# Patient Record
Sex: MALE | Race: WHITE
[De-identification: names, ages, dates, MRNs, and addresses within clinical notes are randomized per-mention and may not be internally consistent; named-entity substitution may affect disease eponyms.]

---

## 2018-05-04 ENCOUNTER — HOSPITAL ENCOUNTER (OUTPATIENT)
Dept: HOSPITAL 17 - NEPE | Age: 48
Setting detail: OBSERVATION
LOS: 3 days | Discharge: HOME | End: 2018-05-07
Attending: HOSPITALIST | Admitting: HOSPITALIST
Payer: MEDICARE

## 2018-05-04 VITALS
HEART RATE: 78 BPM | SYSTOLIC BLOOD PRESSURE: 116 MMHG | OXYGEN SATURATION: 95 % | DIASTOLIC BLOOD PRESSURE: 72 MMHG | RESPIRATION RATE: 16 BRPM

## 2018-05-04 VITALS
OXYGEN SATURATION: 94 % | SYSTOLIC BLOOD PRESSURE: 129 MMHG | DIASTOLIC BLOOD PRESSURE: 87 MMHG | RESPIRATION RATE: 20 BRPM | TEMPERATURE: 98.3 F | HEART RATE: 83 BPM

## 2018-05-04 VITALS
DIASTOLIC BLOOD PRESSURE: 79 MMHG | TEMPERATURE: 97.9 F | SYSTOLIC BLOOD PRESSURE: 109 MMHG | RESPIRATION RATE: 18 BRPM | HEART RATE: 68 BPM | OXYGEN SATURATION: 96 %

## 2018-05-04 VITALS — HEIGHT: 72 IN | BODY MASS INDEX: 20.6 KG/M2 | WEIGHT: 152.12 LBS

## 2018-05-04 VITALS
SYSTOLIC BLOOD PRESSURE: 118 MMHG | HEART RATE: 61 BPM | DIASTOLIC BLOOD PRESSURE: 74 MMHG | OXYGEN SATURATION: 95 % | TEMPERATURE: 97.9 F | RESPIRATION RATE: 16 BRPM

## 2018-05-04 VITALS
DIASTOLIC BLOOD PRESSURE: 74 MMHG | RESPIRATION RATE: 16 BRPM | OXYGEN SATURATION: 95 % | SYSTOLIC BLOOD PRESSURE: 107 MMHG | TEMPERATURE: 97.9 F | HEART RATE: 78 BPM

## 2018-05-04 VITALS — OXYGEN SATURATION: 98 %

## 2018-05-04 VITALS — OXYGEN SATURATION: 95 %

## 2018-05-04 VITALS — OXYGEN SATURATION: 94 %

## 2018-05-04 DIAGNOSIS — F14.90: ICD-10-CM

## 2018-05-04 DIAGNOSIS — Z91.19: ICD-10-CM

## 2018-05-04 DIAGNOSIS — R63.4: ICD-10-CM

## 2018-05-04 DIAGNOSIS — K64.8: ICD-10-CM

## 2018-05-04 DIAGNOSIS — K20.9: ICD-10-CM

## 2018-05-04 DIAGNOSIS — B20: ICD-10-CM

## 2018-05-04 DIAGNOSIS — K29.70: ICD-10-CM

## 2018-05-04 DIAGNOSIS — J44.9: ICD-10-CM

## 2018-05-04 DIAGNOSIS — K64.4: ICD-10-CM

## 2018-05-04 DIAGNOSIS — D69.3: ICD-10-CM

## 2018-05-04 DIAGNOSIS — K74.60: ICD-10-CM

## 2018-05-04 DIAGNOSIS — M19.90: ICD-10-CM

## 2018-05-04 DIAGNOSIS — F12.90: ICD-10-CM

## 2018-05-04 DIAGNOSIS — F41.9: ICD-10-CM

## 2018-05-04 DIAGNOSIS — B18.2: ICD-10-CM

## 2018-05-04 DIAGNOSIS — F17.210: ICD-10-CM

## 2018-05-04 DIAGNOSIS — N17.9: ICD-10-CM

## 2018-05-04 DIAGNOSIS — R55: Primary | ICD-10-CM

## 2018-05-04 DIAGNOSIS — Z90.81: ICD-10-CM

## 2018-05-04 LAB
ALBUMIN SERPL-MCNC: 3.3 GM/DL (ref 3.4–5)
ALP SERPL-CCNC: 141 U/L (ref 45–117)
ALT SERPL-CCNC: 37 U/L (ref 12–78)
AST SERPL-CCNC: 56 U/L (ref 15–37)
BASOPHILS # BLD AUTO: 0.1 TH/MM3 (ref 0–0.2)
BASOPHILS NFR BLD: 0.8 % (ref 0–2)
BILIRUB SERPL-MCNC: 0.6 MG/DL (ref 0.2–1)
BUN SERPL-MCNC: 10 MG/DL (ref 7–18)
CALCIUM SERPL-MCNC: 8.1 MG/DL (ref 8.5–10.1)
CHLORIDE SERPL-SCNC: 108 MEQ/L (ref 98–107)
CREAT SERPL-MCNC: 1.36 MG/DL (ref 0.6–1.3)
EOSINOPHIL # BLD: 0.4 TH/MM3 (ref 0–0.4)
EOSINOPHIL NFR BLD: 3.5 % (ref 0–4)
ERYTHROCYTE [DISTWIDTH] IN BLOOD BY AUTOMATED COUNT: 13.8 % (ref 11.6–17.2)
GFR SERPLBLD BASED ON 1.73 SQ M-ARVRAT: 55 ML/MIN (ref 89–?)
GLUCOSE SERPL-MCNC: 72 MG/DL (ref 74–106)
HCO3 BLD-SCNC: 18.6 MEQ/L (ref 21–32)
HCT VFR BLD CALC: 53.5 % (ref 39–51)
HGB BLD-MCNC: 18.3 GM/DL (ref 13–17)
INR PPP: 1.1 RATIO
LYMPHOCYTES # BLD AUTO: 3.3 TH/MM3 (ref 1–4.8)
LYMPHOCYTES NFR BLD AUTO: 27.7 % (ref 9–44)
MCH RBC QN AUTO: 30.9 PG (ref 27–34)
MCHC RBC AUTO-ENTMCNC: 34.2 % (ref 32–36)
MCV RBC AUTO: 90.3 FL (ref 80–100)
MONOCYTE #: 1.4 TH/MM3 (ref 0–0.9)
MONOCYTES NFR BLD: 11.9 % (ref 0–8)
NEUTROPHILS # BLD AUTO: 6.6 TH/MM3 (ref 1.8–7.7)
NEUTROPHILS NFR BLD AUTO: 56.1 % (ref 16–70)
PLATELET # BLD: 32 TH/MM3 (ref 150–450)
PMV BLD AUTO: 10.5 FL (ref 7–11)
PROT SERPL-MCNC: 7 GM/DL (ref 6.4–8.2)
PROTHROMBIN TIME: 10.9 SEC (ref 9.8–11.6)
RBC # BLD AUTO: 5.93 MIL/MM3 (ref 4.5–5.9)
SODIUM SERPL-SCNC: 139 MEQ/L (ref 136–145)
TROPONIN I SERPL-MCNC: (no result) NG/ML (ref 0.02–0.05)
TROPONIN I SERPL-MCNC: (no result) NG/ML (ref 0.02–0.05)
WBC # BLD AUTO: 11.8 TH/MM3 (ref 4–11)

## 2018-05-04 PROCEDURE — 82140 ASSAY OF AMMONIA: CPT

## 2018-05-04 PROCEDURE — 45378 DIAGNOSTIC COLONOSCOPY: CPT

## 2018-05-04 PROCEDURE — 86901 BLOOD TYPING SEROLOGIC RH(D): CPT

## 2018-05-04 PROCEDURE — 96360 HYDRATION IV INFUSION INIT: CPT

## 2018-05-04 PROCEDURE — 85025 COMPLETE CBC W/AUTO DIFF WBC: CPT

## 2018-05-04 PROCEDURE — 85730 THROMBOPLASTIN TIME PARTIAL: CPT

## 2018-05-04 PROCEDURE — 43239 EGD BIOPSY SINGLE/MULTIPLE: CPT

## 2018-05-04 PROCEDURE — 88305 TISSUE EXAM BY PATHOLOGIST: CPT

## 2018-05-04 PROCEDURE — 87902 NFCT AGT GNTYP ALYS HEP C: CPT

## 2018-05-04 PROCEDURE — 87522 HEPATITIS C REVRS TRNSCRPJ: CPT

## 2018-05-04 PROCEDURE — 80053 COMPREHEN METABOLIC PANEL: CPT

## 2018-05-04 PROCEDURE — 82105 ALPHA-FETOPROTEIN SERUM: CPT

## 2018-05-04 PROCEDURE — 96374 THER/PROPH/DIAG INJ IV PUSH: CPT

## 2018-05-04 PROCEDURE — 93306 TTE W/DOPPLER COMPLETE: CPT

## 2018-05-04 PROCEDURE — 71045 X-RAY EXAM CHEST 1 VIEW: CPT

## 2018-05-04 PROCEDURE — 82550 ASSAY OF CK (CPK): CPT

## 2018-05-04 PROCEDURE — 80307 DRUG TEST PRSMV CHEM ANLYZR: CPT

## 2018-05-04 PROCEDURE — 88312 SPECIAL STAINS GROUP 1: CPT

## 2018-05-04 PROCEDURE — G0378 HOSPITAL OBSERVATION PER HR: HCPCS

## 2018-05-04 PROCEDURE — 70450 CT HEAD/BRAIN W/O DYE: CPT

## 2018-05-04 PROCEDURE — 96361 HYDRATE IV INFUSION ADD-ON: CPT

## 2018-05-04 PROCEDURE — 74176 CT ABD & PELVIS W/O CONTRAST: CPT

## 2018-05-04 PROCEDURE — 86900 BLOOD TYPING SEROLOGIC ABO: CPT

## 2018-05-04 PROCEDURE — 93005 ELECTROCARDIOGRAM TRACING: CPT

## 2018-05-04 PROCEDURE — 80074 ACUTE HEPATITIS PANEL: CPT

## 2018-05-04 PROCEDURE — C9113 INJ PANTOPRAZOLE SODIUM, VIA: HCPCS

## 2018-05-04 PROCEDURE — 85610 PROTHROMBIN TIME: CPT

## 2018-05-04 PROCEDURE — 00813 ANES UPR LWR GI NDSC PX: CPT

## 2018-05-04 PROCEDURE — 84484 ASSAY OF TROPONIN QUANT: CPT

## 2018-05-04 PROCEDURE — 86850 RBC ANTIBODY SCREEN: CPT

## 2018-05-04 PROCEDURE — 83690 ASSAY OF LIPASE: CPT

## 2018-05-04 PROCEDURE — 82552 ASSAY OF CPK IN BLOOD: CPT

## 2018-05-04 PROCEDURE — 99285 EMERGENCY DEPT VISIT HI MDM: CPT

## 2018-05-04 RX ADMIN — STANDARDIZED SENNA CONCENTRATE AND DOCUSATE SODIUM SCH TAB: 8.6; 5 TABLET, FILM COATED ORAL at 21:36

## 2018-05-04 RX ADMIN — SODIUM CHLORIDE AND POTASSIUM CHLORIDE SCH MLS/HR: 9; 1.49 INJECTION, SOLUTION INTRAVENOUS at 17:11

## 2018-05-04 NOTE — RADRPT
EXAM DATE/TIME:  05/04/2018 13:59 

 

[facility] COMPARISON:     

CT ABDOMEN & PELVIS W CONTRAST, Marely 10, 2016, 21:10.

 

 

INDICATIONS :     

Blood in stool since yesterday

                  

 

ORAL CONTRAST:      

No oral contrast ingested.

                  

 

RADIATION DOSE:     

6.64 CTDIvol (mGy) 

 

 

MEDICAL HISTORY :     

Cardiovascular disease. Hepatitis C. 

 

SURGICAL HISTORY :      

Splenectomy. 

 

ENCOUNTER:      

Initial

 

ACUITY:      

1 day

 

PAIN SCALE:      

0/10

 

LOCATION:       

Bilateral  abdomen

 

TECHNIQUE:     

Volumetric scanning of the abdomen and pelvis was performed.  Using automated exposure control and ad
justment of the mA and/or kV according to patient size, radiation dose was kept as low as reasonably 
achievable to obtain optimal diagnostic quality images.  DICOM format image data is available electro
nically for review and comparison.  

 

FINDINGS:     

 

LOWER LUNGS:     

There is dependent atelectasis bilaterally.

 

LIVER:     

Homogeneous density without lesion.  There is no dilation of the biliary tree.  No calcified gallston
es.

 

SPLEEN:     

Multiple clips are present in the left upper quadrant reportedly related to splenectomy. There is res
idual splenic tissue in the left upper quadrant, stable from the prior examination.

 

PANCREAS:     

Within normal limits. 

 

KIDNEYS:     

Normal in size and shape.  There is no mass, stone, or hydronephrosis.

 

ADRENAL GLANDS:     

Within normal limits.

 

VASCULAR:     

There is no aortic aneurysm. There is mild atherosclerotic disease.

 

BOWEL/MESENTERY:     

The stomach, small bowel, and colon demonstrate no acute abnormality.  There is no free intraperitone
al air or fluid. 

 

ABDOMINAL WALL:     

Within normal limits.

 

RETROPERITONEUM:     

There is no lymphadenopathy.

 

BLADDER:     

No wall thickening or mass.

 

REPRODUCTIVE:     

Within normal limits.

 

INGUINAL:     

There is no lymphadenopathy or hernia.

 

MUSCULOSKELETAL:     

There are mild degenerative changes of the lumbar spine.

 

CONCLUSION:     

1. No acute finding is identified on this noncontrast examination to explain the clinical symptoms.

2. Mild atherosclerotic disease.

 

 

 

 Hua Elliott MD on May 04, 2018 at 14:37           

Board Certified Radiologist.

 This report was verified electronically.

## 2018-05-04 NOTE — EKG
Date Performed: 05/04/2018       Time Performed: 12:55:07

 

PTAGE:      48 years

 

EKG:      Sinus rhythm 

 

 BORDERLINE RIGHT AXIS DEVIATION SEPTAL MYOCARDIAL INFARCTION When compared to previous tracing, ante
rior T wave changes Seen on previous tracing, are less prominant. ABNORMAL ECG

 

PREVIOUS TRACING       : 06/18/2016 00.50

 

DOCTOR:   Nnamdi Baird  Interpretating Date/Time  05/04/2018 17:50:30

## 2018-05-04 NOTE — PD
Physical Exam


Date Seen by Provider:  May 4, 2018





Data


Data


Last Documented VS





Vital Signs








  Date Time  Temp Pulse Resp B/P (MAP) Pulse Ox O2 Delivery O2 Flow Rate FiO2


 


5/4/18 12:25     94 Room Air  


 


5/4/18 12:17 98.3 83 20 129/87 (101)    








Orders





 Orders


Complete Blood Count With Diff (5/4/18 12:22)


Comprehensive Metabolic Panel (5/4/18 12:22)


Lipase (5/4/18 12:22)


Ammonia (5/4/18 12:22)


Prothrombin Time / Inr (Pt) (5/4/18 12:22)


Act Partial Throm Time (Ptt) (5/4/18 12:22)


Alcohol (Ethanol) (5/4/18 12:22)


Urinalysis - C+S If Indicated (5/4/18 12:22)


Type And Screen (5/4/18 12:22)


Chest, Single Ap (5/4/18 12:22)


Ecg Monitoring (5/4/18 12:22)


Iv Access Insert/Monitor (5/4/18 12:22)


Oximetry (5/4/18 12:22)


Pantoprazole Inj (Protonix Inj) (5/4/18 12:30)


Sodium Chlor 0.9% 1000 Ml Inj (Ns 1000 M (5/4/18 12:22)


Sodium Chloride 0.9% Flush (Ns Flush) (5/4/18 12:30)


Electrocardiogram (5/4/18 12:22)


Ckmb (Isoenzyme) Profile (5/4/18 12:22)


Troponin I (5/4/18 12:22)


Sodium Chloride 0.9% Flush (Ns Flush) (5/4/18 12:30)


Ct Brain W/O Iv Contrast(Rout) (5/4/18 )


Ct Abd/Pel W/O Iv Contrast (5/4/18 12:54)


CKMB (5/4/18 12:30)


CKMB% (5/4/18 12:30)


Drug Screen, Random Urine (5/4/18 13:48)





Labs





Laboratory Tests








Test


  5/4/18


12:30


 


White Blood Count 11.8 TH/MM3 


 


Red Blood Count 5.93 MIL/MM3 


 


Hemoglobin 18.3 GM/DL 


 


Hematocrit 53.5 % 


 


Mean Corpuscular Volume 90.3 FL 


 


Mean Corpuscular Hemoglobin 30.9 PG 


 


Mean Corpuscular Hemoglobin


Concent 34.2 % 


 


 


Red Cell Distribution Width 13.8 % 


 


Platelet Count 32 TH/MM3 


 


Mean Platelet Volume 10.5 FL 


 


Neutrophils (%) (Auto) 56.1 % 


 


Lymphocytes (%) (Auto) 27.7 % 


 


Monocytes (%) (Auto) 11.9 % 


 


Eosinophils (%) (Auto) 3.5 % 


 


Basophils (%) (Auto) 0.8 % 


 


Neutrophils # (Auto) 6.6 TH/MM3 


 


Lymphocytes # (Auto) 3.3 TH/MM3 


 


Monocytes # (Auto) 1.4 TH/MM3 


 


Eosinophils # (Auto) 0.4 TH/MM3 


 


Basophils # (Auto) 0.1 TH/MM3 


 


CBC Comment AUTO DIFF 


 


Differential Comment


  AUTO DIFF


CONFIRMED


 


Prothrombin Time 10.9 SEC 


 


Prothromb Time International


Ratio 1.1 RATIO 


 


 


Activated Partial


Thromboplast Time 31.4 SEC 


 


 


Blood Urea Nitrogen 10 MG/DL 


 


Creatinine 1.36 MG/DL 


 


Random Glucose 72 MG/DL 


 


Total Protein 7.0 GM/DL 


 


Albumin 3.3 GM/DL 


 


Calcium Level 8.1 MG/DL 


 


Alkaline Phosphatase 141 U/L 


 


Aspartate Amino Transf


(AST/SGOT) 56 U/L 


 


 


Alanine Aminotransferase


(ALT/SGPT) 37 U/L 


 


 


Total Bilirubin 0.6 MG/DL 


 


Sodium Level 139 MEQ/L 


 


Potassium Level 3.8 MEQ/L 


 


Chloride Level 108 MEQ/L 


 


Carbon Dioxide Level 18.6 MEQ/L 


 


Anion Gap 12 MEQ/L 


 


Estimat Glomerular Filtration


Rate 55 ML/MIN 


 


 


Ammonia 37 MCMOL/L 


 


Total Creatine Kinase 267 U/L 


 


Creatine Kinase MB 5.4 NG/ML 


 


Troponin I


  LESS THAN 0.02


NG/ML


 


Lipase 203 U/L 


 


Ethyl Alcohol Level 29 MG/DL 











[facility]


Medical Record Reviewed:  Yes


Supervised Visit with CHERYL:  Yes


Narrative Course


I, Dr. Tubbs, have reviewed the advance practice practitioner's documentation 

and am in agreement, met with the patient face to face, made the diagnosis, and 

the medical decision making was done by me.  





*My assessment and Findings:


Syncope -patient is a 48-year-old male with history of hepatitis C, end-stage 

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 

syncopal episode today.  Reports bright red blood in stools, reports that today

, he has been having abdominal pain - pain worse to upper abdomen. 





CBC & BMP Diagram


5/4/18 12:30








Total Protein 7.0, Albumin 3.3 L, Calcium Level 8.1 L, Alkaline Phosphatase 141 

H, Aspartate Amino Transf (AST/SGOT) 56 H, Alanine Aminotransferase (ALT/SGPT) 

37, Total Bilirubin 0.6


Labs are reviewed, CT's pending











Niurka Tubbs DO May 4, 2018 14:05

## 2018-05-04 NOTE — PD
HPI


Chief Complaint:  GI Complaint


Time Seen by Provider:  12:22


Travel History


International Travel<30 days:  No


Contact w/Intl Traveler<30days:  No


Traveled to known affect area:  No





History of Present Illness


HPI


Patient comes emergency department complaining of syncopal episode that 

occurred shortly prior to arrival.  Patient reports he has had a couple 

syncopal episodes in the past several days.  Patient reports feeling fatigue 

and bright red blood per rectum.  Patient reports had similar in the past 

secondary to thrombocytopenia.  Patient reports he is HIV and hepatitis C 

positive.  Patient reports taking his medications as prescribed and his last 

viral load was undetectable per patient.  Patient complaining of chronic right 

upper quadrant pain is gotten progressively worse over the past couple of days.

  Describes pain is a sharp stabbing pain is worse to palpation.  Reports pain 

radiates throughout his abdomen.  Denies any nausea, vomiting, chest pain or 

shortness breath, fevers, headache, neck pain, or change in bladder.





PFSH


Past Medical History


Arthritis:  Yes


Asthma:  No


Autoimmune Disease:  Yes (HIV)


Blood Disorders:  Yes (THROMBOCYTOPENIA, HIV +)


Anxiety:  Yes


Depression:  No


Heart Rhythm Problems:  No


Cancer:  No


Cardiovascular Problems:  Yes


High Cholesterol:  No


Chemotherapy:  No


Chest Pain:  Yes


Congestive Heart Failure:  No


COPD:  Yes


Cerebrovascular Accident:  No


Diabetes:  No


Diminished Hearing:  No


Endocrine:  No


Gastrointestinal Disorders:  No


GERD:  No


Glaucoma:  No


Genitourinary:  No


Headaches:  No


Hepatitis:  Yes (C)


Hiatal Hernia:  No


Hypertension:  No


Immune Disorder:  Yes (HIV )


Implanted Vascular Access Dvce:  No


Kidney Stones:  No


Musculoskeletal:  Yes


Neurologic:  Yes


Psychiatric:  Yes


Reproductive:  No


Respiratory:  Yes (COPD)


Immunizations Current:  Yes


Migraines:  Yes


Myocardial Infarction:  No


Pneumonia:  Yes


Radiation Therapy:  No


Renal Failure:  No


Seizures:  No


Sickle Cell Disease:  No


Sleep Apnea:  Yes


Thyroid Disease:  No


Ulcer:  No


Tetanus Vaccination:  < 5 Years


Influenza Vaccination:  Yes


PNEUMOCCOCAL Vaccine (Year):  2





Past Surgical History


Abdominal Surgery:  Yes (SPLENECTOMY)


AICD:  No


Arteriovenous Shunt:  No


Cardiac Surgery:  No


Ear Surgery:  No


Endocrine Surgery:  No


Eye Surgery:  No


Genitourinary Surgery:  No


Gynecologic Surgery:  No


Insulin Pump:  No


Joint Replacement:  No


Neurologic Surgery:  No


Oral Surgery:  No


Pacemaker:  No


Thoracic Surgery:  Yes


Other Surgery:  Yes (spleenectomy )





Social History


Alcohol Use:  Yes ("occasionally" )


Tobacco Use:  Yes (1  PPD )


Substance Use:  Yes (marijuana use daily )





Allergies-Medications


(Allergen,Severity, Reaction):  


Coded Allergies:  


     No Known Allergies (Unverified , 6/17/16)


Reported Meds & Prescriptions





Reported Meds & Active Scripts


Active


Reported


Triumeq (Abacavir-Dolutegravir-Lamivudine) 600- Mg Tab 1 Tab PO DAILY


     Hazardous agent; use appropriate precautions for handling & disposal.








Review of Systems


Except as stated in HPI:  all other systems reviewed are Neg





Physical Exam


Narrative


GENERAL: Well-developed, well nourished, in no acute distress, and non-ill 

appearing.


SKIN: Focused skin assessment warm and dry.


HEAD: Atraumatic. Normocephalic. 


EYES: Pupils equal and round. EOMI. No scleral icterus. No injection or 

drainage. 


ENT: No nasal bleeding or discharge.  Mucous membranes pink and moist.


NECK: Trachea midline. Supple.  No nuclear rigidity.


CARDIOVASCULAR: Regular rate and rhythm.  No murmur appreciated.


RESPIRATORY: No accessory muscle use.  No respiratory distress. Clear to 

auscultation. Breath sounds equal bilaterally. 


GASTROINTESTINAL: Abdomen soft, nondistended, and no guarding. Hepatic and 

splenic margins not palpable.  Normal bowel sounds x4.  No pulsatile mass.  

Patient reports tenderness palpation throughout.


MUSCULOSKELETAL: No obvious deformities. No clubbing.  No cyanosis.  No edema.  

Full range of motion.


NEUROLOGICAL: Awake and alert. No obvious cranial nerve deficits.  Motor 

grossly within normal limits. Normal speech.


PSYCHIATRIC: Appropriate mood and affect; insight and judgment normal.





Data


Data


Last Documented VS





Vital Signs








  Date Time  Temp Pulse Resp B/P (MAP) Pulse Ox O2 Delivery O2 Flow Rate FiO2


 


5/4/18 14:56  78 16 116/72 (87) 95 Room Air  


 


5/4/18 12:17 98.3       








Orders





 Orders


Complete Blood Count With Diff (5/4/18 12:22)


Comprehensive Metabolic Panel (5/4/18 12:22)


Lipase (5/4/18 12:22)


Ammonia (5/4/18 12:22)


Prothrombin Time / Inr (Pt) (5/4/18 12:22)


Act Partial Throm Time (Ptt) (5/4/18 12:22)


Alcohol (Ethanol) (5/4/18 12:22)


Urinalysis - C+S If Indicated (5/4/18 12:22)


Type And Screen (5/4/18 12:22)


Chest, Single Ap (5/4/18 12:22)


Ecg Monitoring (5/4/18 12:22)


Iv Access Insert/Monitor (5/4/18 12:22)


Oximetry (5/4/18 12:22)


Pantoprazole Inj (Protonix Inj) (5/4/18 12:30)


Sodium Chlor 0.9% 1000 Ml Inj (Ns 1000 M (5/4/18 12:22)


Sodium Chloride 0.9% Flush (Ns Flush) (5/4/18 12:30)


Electrocardiogram (5/4/18 12:22)


Ckmb (Isoenzyme) Profile (5/4/18 12:22)


Troponin I (5/4/18 12:22)


Sodium Chloride 0.9% Flush (Ns Flush) (5/4/18 12:30)


Ct Brain W/O Iv Contrast(Rout) (5/4/18 )


Ct Abd/Pel W/O Iv Contrast (5/4/18 12:54)


CKMB (5/4/18 12:30)


CKMB% (5/4/18 12:30)


Drug Screen, Random Urine (5/4/18 13:48)


Admit Order (Ed Use Only) (5/4/18 )


Cardiac Monitor / Telemetry DANIAL.Q8H (5/4/18 15:21)


Vital Signs (Adult) Q4H (5/4/18 15:21)


Activity Bed Rest (5/4/18 15:21)


Notify Dr: Other (5/4/18 15:21)





Labs





Laboratory Tests








Test


  5/4/18


12:30


 


White Blood Count 11.8 TH/MM3 


 


Red Blood Count 5.93 MIL/MM3 


 


Hemoglobin 18.3 GM/DL 


 


Hematocrit 53.5 % 


 


Mean Corpuscular Volume 90.3 FL 


 


Mean Corpuscular Hemoglobin 30.9 PG 


 


Mean Corpuscular Hemoglobin


Concent 34.2 % 


 


 


Red Cell Distribution Width 13.8 % 


 


Platelet Count 32 TH/MM3 


 


Mean Platelet Volume 10.5 FL 


 


Neutrophils (%) (Auto) 56.1 % 


 


Lymphocytes (%) (Auto) 27.7 % 


 


Monocytes (%) (Auto) 11.9 % 


 


Eosinophils (%) (Auto) 3.5 % 


 


Basophils (%) (Auto) 0.8 % 


 


Neutrophils # (Auto) 6.6 TH/MM3 


 


Lymphocytes # (Auto) 3.3 TH/MM3 


 


Monocytes # (Auto) 1.4 TH/MM3 


 


Eosinophils # (Auto) 0.4 TH/MM3 


 


Basophils # (Auto) 0.1 TH/MM3 


 


CBC Comment AUTO DIFF 


 


Differential Comment


  AUTO DIFF


CONFIRMED


 


Prothrombin Time 10.9 SEC 


 


Prothromb Time International


Ratio 1.1 RATIO 


 


 


Activated Partial


Thromboplast Time 31.4 SEC 


 


 


Blood Urea Nitrogen 10 MG/DL 


 


Creatinine 1.36 MG/DL 


 


Random Glucose 72 MG/DL 


 


Total Protein 7.0 GM/DL 


 


Albumin 3.3 GM/DL 


 


Calcium Level 8.1 MG/DL 


 


Alkaline Phosphatase 141 U/L 


 


Aspartate Amino Transf


(AST/SGOT) 56 U/L 


 


 


Alanine Aminotransferase


(ALT/SGPT) 37 U/L 


 


 


Total Bilirubin 0.6 MG/DL 


 


Sodium Level 139 MEQ/L 


 


Potassium Level 3.8 MEQ/L 


 


Chloride Level 108 MEQ/L 


 


Carbon Dioxide Level 18.6 MEQ/L 


 


Anion Gap 12 MEQ/L 


 


Estimat Glomerular Filtration


Rate 55 ML/MIN 


 


 


Ammonia 37 MCMOL/L 


 


Total Creatine Kinase 267 U/L 


 


Creatine Kinase MB 5.4 NG/ML 


 


Troponin I


  LESS THAN 0.02


NG/ML


 


Lipase 203 U/L 


 


Ethyl Alcohol Level 29 MG/DL 











MDM


Medical Decision Making


Medical Screen Exam Complete:  Yes


Emergency Medical Condition:  Yes


Interpretation(s)


EKG reviewed by Dr. Tubbs shows sinus rhythm ventricular rate of 75.  No STEMI.


Differential Diagnosis


GI bleed, syncope, ITP, substance abuse, closed head injury, intracranial 

hemorrhage


Narrative Course


Patient was seen and examined.  IV was established patient placed on cardiac 

monitoring.  Initial laboratory radiological studies were ordered.  Patient was 

hydrated with IV fluids and given IV Protonix.  Discussed patient with Dr. Tubbs

, who saw and evaluated patient and is in agreement plan of care and 

disposition.  Discussed all findings with the exception of urinalysis, which 

patient has not provide a sample of yet, and plan of care with patient.  

Patient is agreeable for admission.  All questions were answered.  Discussed 

patient with hospitalist who is agreeable to admit the patient.  Patient 

remained stable throughout ED course.





[facility] Point of Care


Internal Pos. & Neg. Controls:  Passed


Fecal Specimen Occult Blood:  Negative


Comment


Verbal consent was obtained.  Digital rectal exam was performed. Stool specimen 

applied and test interpreted between 1 and 3 minutes of application and the 

result was negative.


Internal Controls: Both positive and negative controls were validated.  Staff 

RN Candy was present during this exam.  External hemorrhoids were noted.





Physician Communication


Physician Communication


[id number] discussed patient with Dr. Funk, who is agreeable to admit the 

patient.





Diagnosis





 Primary Impression:  


 Syncope


 Qualified Codes:  R55 - Syncope and collapse


 Additional Impressions:  


 Hematochezia


 Chronic ITP (idiopathic thrombocytopenia)





Admitting Information


Admitting Physician Requests:  Observation











Foster Francis May 4, 2018 13:03

## 2018-05-04 NOTE — HHI.HP
__________________________________________________





[facility]


Service


[facility]


Primary Care Physician


No Primary Care Physician


Admission Diagnosis





Syncope, hematochezia, ITP


Diagnoses:  


(1) Acute kidney injury


(2) Abdominal pain


(3) Hematochezia


(4) Chronic ITP (idiopathic thrombocytopenia)


(5) Syncope


(6) Hepatitis C


(7) HIV (human immunodeficiency virus infection)


(8) Tobacco use


Chief Complaint:  


Syncope


Travel History


International Travel<30 Days:  No


Contact w/Intl Traveler <30 Da:  No


Traveled to Known Affected Are:  No


History of Present Illness


The patient is a 48 year-old male who presented to the emergency department 

following a syncopal episode. He states that he has "blacked out" about 5 times 

in the past 3 days. He denies hitting his head.  He states that the episodes 

occur when he stands up to walk.  He feels lightheaded and then passes out.  He 

states that he has not been eating well over the past week or so.  He has had 

no appetite.  He has had some nausea, but no vomiting.  He denies diarrhea or 

constipation, but has had some bright red blood in his stool the last couple 

days.  He has a history of hepatitis C and HIV, as well as chronic 

thrombocytopenia.  He reports a weight loss of 10 pounds in the last week.  He 

has diffuse abdominal pain, worse in the upper abdomen.





Review of Systems


Constitutional:  COMPLAINS OF: Weight loss, DENIES: Fever, Chills, Night Sweats


Eyes:  DENIES: Blurred vision, Vision loss


Ears, nose, mouth, throat:  DENIES: Hearing loss


Respiratory:  DENIES: Cough, Wheezing, Sputum production, Shortness of breath


Cardiovascular:  COMPLAINS OF: Syncope, DENIES: Chest pain, Palpitations, 

Dyspnea on Exertion, Lower Extremity Edema


Gastrointestinal:  COMPLAINS OF: Bloody stools, Nausea, DENIES: Abdominal pain, 

Constipation, Diarrhea, Vomiting


Genitourinary:  DENIES: Urinary frequency, Urinary incontinence, Urgency, 

Hematuria, Dysuria, Nocturia


Musculoskeletal:  DENIES: Joint pain, Muscle aches


Integumentary:  DENIES: Pruritus, Rash


Hematologic/lymphatic:  DENIES: Bruising


Neurologic:  DENIES: Headache





Past Family Social History


Past Medical History


HIV


Thrombocytopenia


Hepatitis C


Anxiety


Arthritis


COPD


Past Surgical History


Splenectomy


Left ankle surgery


Reported Medications


Triumeq (Abacavir-Dolutegravir-Lamivudine) 600- Mg Tab 1 Tab PO DAILY


Allergies:  


Coded Allergies:  


     No Known Allergies (Unverified  Allergy, Unknown, 18)


Family History


Denies significant family medical history


Social History


Smokes 1 pack per day.  Admits to marijuana use.  Does also admit to using 

cocaine last night.  Reports a remote history of IV drug abuse, but not for 

"many years".  Reports occasional alcohol use.





Physical Exam


Vital Signs





Vital Signs








  Date Time  Temp Pulse Resp B/P (MAP) Pulse Ox O2 Delivery O2 Flow Rate FiO2


 


18 16:31        


 


18 14:56  78 16 116/72 (87) 95 Room Air  


 


18 12:25     94 Room Air  


 


18 12:17 98.3 83 20 129/87 (101) 94   








Physical Exam


GENERAL: Thin male in no acute distress. 


HEENT: Normocephalic, atraumatic. Pupils equal, round and reactive. Extraocular 

movements intact. No scleral icterus. No injection or drainage. Oropharynx is 

clear. Mucous membranes are somewhat dry. 


CARDIOVASCULAR: Regular rate and rhythm without murmurs, gallops, or rubs. 


RESPIRATORY: Clear to auscultation. No wheezes, rales, or rhonchi. Breathing is 

non-labored. 


GASTROINTESTINAL: Abdomen soft, nondistended. Tender to palpation diffusely 

without rebound or guarding, more tender in the upper abdomen.  


EXTREMITIES: No lower extremity edema. No calf tenderness.


PSYCH: Alert and oriented x 3.


Laboratory





Laboratory Tests








Test


  18


12:30


 


White Blood Count 11.8 


 


Red Blood Count 5.93 


 


Hemoglobin 18.3 


 


Hematocrit 53.5 


 


Mean Corpuscular Volume 90.3 


 


Mean Corpuscular Hemoglobin 30.9 


 


Mean Corpuscular Hemoglobin


Concent 34.2 


 


 


Red Cell Distribution Width 13.8 


 


Platelet Count 32 


 


Mean Platelet Volume 10.5 


 


Neutrophils (%) (Auto) 56.1 


 


Lymphocytes (%) (Auto) 27.7 


 


Monocytes (%) (Auto) 11.9 


 


Eosinophils (%) (Auto) 3.5 


 


Basophils (%) (Auto) 0.8 


 


Neutrophils # (Auto) 6.6 


 


Lymphocytes # (Auto) 3.3 


 


Monocytes # (Auto) 1.4 


 


Eosinophils # (Auto) 0.4 


 


Basophils # (Auto) 0.1 


 


CBC Comment AUTO DIFF 


 


Differential Comment


  AUTO DIFF


CONFIRMED


 


Prothrombin Time 10.9 


 


Prothromb Time International


Ratio 1.1 


 


 


Activated Partial


Thromboplast Time 31.4 


 


 


Blood Urea Nitrogen 10 


 


Creatinine 1.36 


 


Random Glucose 72 


 


Total Protein 7.0 


 


Albumin 3.3 


 


Calcium Level 8.1 


 


Alkaline Phosphatase 141 


 


Aspartate Amino Transf


(AST/SGOT) 56 


 


 


Alanine Aminotransferase


(ALT/SGPT) 37 


 


 


Total Bilirubin 0.6 


 


Sodium Level 139 


 


Potassium Level 3.8 


 


Chloride Level 108 


 


Carbon Dioxide Level 18.6 


 


Anion Gap 12 


 


Estimat Glomerular Filtration


Rate 55 


 


 


Ammonia 37 


 


Total Creatine Kinase 267 


 


Creatine Kinase MB 5.4 


 


Troponin I LESS THAN 0.02 


 


Lipase 203 


 


Ethyl Alcohol Level 29 








Result Diagram:  


18 1230                                                                    

            18 1230





Imaging





Last Impressions








Abdomen/Pelvis CT 18 1254 Signed





Impressions: 





 Service Date/Time:  Friday, May 4, 2018 13:59 - CONCLUSION:  1. No acute 

finding 





 is identified on this noncontrast examination to explain the clinical 

symptoms. 





 2. Mild atherosclerotic disease.     Hua Elliott MD 


 


Chest X-Ray 18 1222 Signed





Impressions: 





 Service Date/Time:  Friday, May 4, 2018 12:37 - CONCLUSION:  Stable appearance 





 with no acute disease.       Saul Ulloa MD 


 


Head CT 18 0000 Signed





Impressions: 





 Service Date/Time:  Friday, May 4, 2018 13:57 - CONCLUSION:  1. No acute 





 intracranial abnormality identified.     Mateo Yeung MD 











Caprini VTE Risk Assessment


Caprini VTE Risk Assessment:  No/Low Risk (score <= 1)


Caprini Risk Assessment Model











 Point Value = 1          Point Value = 2  Point Value = 3  Point Value = 5


 


Age 41-60


Minor surgery


BMI > 25 kg/m2


Swollen legs


Varicose veins


Pregnancy or postpartum


History of unexplained or recurrent


   spontaneous 


Oral contraceptives or hormone


   replacement


Sepsis (< 1 month)


Serious lung disease, including


   pneumonia (< 1 month)


Abnormal pulmonary function


Acute myocardial infarction


Congestive heart failure (< 1 month)


History of inflammatory bowel disease


Medical patient at bed rest Age 61-74


Arthroscopic surgery


Major open surgery (> 45 min)


Laparoscopic surgery (> 45 min)


Malignancy


Confined to bed (> 72 hours)


Immobilizing plaster cast


Central venous access Age >= 75


History of VTE


Family history of VTE


Factor V Leiden


Prothrombin 06605A


Lupus anticoagulant


Anticardiolipin antibodies


Elevated serum homocysteine


Heparin-induced thrombocytopenia


Other congenital or acquired


   thrombophilia Stroke (< 1 month)


Elective arthroplasty


Hip, pelvis, or leg fracture


Acute spinal cord injury (< 1 month)








Prophylaxis Regimen











   Total Risk


Factor Score Risk Level Prophylaxis Regimen


 


0-1      Low Early ambulation


 


2 Moderate Order ONE of the following:


*Sequential Compression Device (SCD)


*Heparin 5000 units SQ BID


 


3-4 Higher Order ONE of the following medications:


*Heparin 5000 units SQ TID


*Enoxaparin/Lovenox 40 mg SQ daily (WT < 150 kg, CrCl > 30 mL/min)


*Enoxaparin/Lovenox 30 mg SQ daily (WT < 150 kg, CrCl > 10-29 mL/min)


*Enoxaparin/Lovenox 30 mg SQ BID (WT < 150 kg, CrCl > 30 mL/min)


AND/OR


*Sequential Compression Device (SCD)


 


5 or more Highest Order ONE of the following medications:


*Heparin 5000 units SQ TID (Preferred with Epidurals)


*Enoxaparin/Lovenox 40 mg SQ daily (WT < 150 kg, CrCl > 30 mL/min)


*Enoxaparin/Lovenox 30 mg SQ daily (WT < 150 kg, CrCl > 10-29 mL/min)


*Enoxaparin/Lovenox 30 mg SQ BID (WT < 150 kg, CrCl > 30 mL/min)


AND


*Sequential Compression Device (SCD)











Assessment and Plan


Assessment and Plan


1.  Syncopal episode: Possibly secondary to dehydration.  Check echocardiogram.

  Monitor on telemetry.  Check serial cardiac enzymes and EKGs.


2.  Acute kidney injury: Patient appears to be dehydrated.  He has had poor 

oral intake over the past week with decreased appetite, nausea, abdominal pain, 

and weight loss.  Start IV fluids.  Monitor BUN and creatinine.


3.  Thrombocytopenia: Patient reports chronic ITP.  Monitor labs.  Monitor for 

worsening bleeding episodes.


4.  Hematochezia: Patient reports bright red blood with bowel movements.  

Consult GI.


5.  HIV: Continue home medications.


6.  Hepatitis C: Patient reports no treatment.  GI consultation requested.


7.  DVT prophylaxis: SCDs, ULICES hose.  Avoid chemical prophylaxis secondary to 

hematochezia, thrombocytopenia.


8.  Polysubstance abuse: He admits to marijuana and cocaine use.  Patient was 

counseled.


9.  Tobacco abuse: Patient was counseled regarding smoking cessation.





Problem Qualifiers





(1) Syncope:  


Qualified Codes:  R55 - Syncope and collapse








Duke Funk MD May 4, 2018 17:36

## 2018-05-04 NOTE — RADRPT
EXAM DATE/TIME:  05/04/2018 12:37 

 

[facility] COMPARISON:     

CHEST SINGLE AP, June 17, 2016, 23:49.

 

                     

INDICATIONS :     

Short of breath.

                     

 

MEDICAL HISTORY :     

Chronic obstructive pulmonary disease.          

 

SURGICAL HISTORY :     

None.   

 

ENCOUNTER:     

Initial                                        

 

ACUITY:     

2 days      

 

PAIN SCORE:     

8/10

 

LOCATION:     

Bilateral chest 

 

FINDINGS:     

A single view of the chest demonstrates the lungs to be symmetrically aerated without evidence of mas
s, infiltrate or effusion.  The cardiomediastinal contours are unremarkable.  Osseous structures are 
intact. Multiple surgical clips in the upper left abdomen status post apparent splenectomy.

 

CONCLUSION:     

Stable appearance with no acute disease.  

 

 

 

 Saul Ulloa MD on May 04, 2018 at 12:49           

Board Certified Radiologist.

 This report was verified electronically.

## 2018-05-04 NOTE — RADRPT
EXAM DATE/TIME:  05/04/2018 13:57 

 

[facility] COMPARISON:     

CT ABDOMEN & PELVIS W CONTRAST, Marely 10, 2016, 21:10.

 

 

INDICATIONS :     

Syncopal episode

                      

 

RADIATION DOSE:     

56.35 CTDIvol (mGy) 

 

 

 

MEDICAL HISTORY :     

Cardiovascular disease. Hepatitis C. 

 

SURGICAL HISTORY :      

Splenectomy. 

 

ENCOUNTER:      

Initial

 

ACUITY:      

1 day

 

PAIN SCALE:      

0/10

 

LOCATION:        

cranial 

 

TECHNIQUE:     

Multiple contiguous axial images were obtained of the head.  Using automated exposure control and adj
ustment of the mA and/or kV according to patient size, radiation dose was kept as low as reasonably a
chievable to obtain optimal diagnostic quality images.   DICOM format image data is available electro
nically for review and comparison.  

 

FINDINGS:     

 

CEREBRUM:     

The ventricles are normal for age.  No evidence of midline shift, mass lesion, hemorrhage or acute in
farction.  No extra-axial fluid collections are seen.

 

POSTERIOR FOSSA:     

The cerebellum and brainstem are intact.  The 4th ventricle is midline.  The cerebellopontine angle i
s unremarkable.

 

EXTRACRANIAL:     

The visualized portion of the orbits is intact.

 

SKULL:     

The calvaria is intact.  No evidence of skull fracture.

 

CONCLUSION:     

1. No acute intracranial abnormality identified.

 

 

 

 Mateo Yeung MD on May 04, 2018 at 14:29           

Board Certified Radiologist.

 This report was verified electronically.

## 2018-05-05 VITALS
RESPIRATION RATE: 16 BRPM | TEMPERATURE: 97.9 F | OXYGEN SATURATION: 93 % | HEART RATE: 56 BPM | SYSTOLIC BLOOD PRESSURE: 112 MMHG | DIASTOLIC BLOOD PRESSURE: 76 MMHG

## 2018-05-05 VITALS
RESPIRATION RATE: 18 BRPM | HEART RATE: 66 BPM | SYSTOLIC BLOOD PRESSURE: 124 MMHG | TEMPERATURE: 99.1 F | DIASTOLIC BLOOD PRESSURE: 85 MMHG | OXYGEN SATURATION: 93 %

## 2018-05-05 VITALS
DIASTOLIC BLOOD PRESSURE: 72 MMHG | HEART RATE: 69 BPM | TEMPERATURE: 98 F | SYSTOLIC BLOOD PRESSURE: 127 MMHG | OXYGEN SATURATION: 93 % | RESPIRATION RATE: 19 BRPM

## 2018-05-05 VITALS
RESPIRATION RATE: 18 BRPM | DIASTOLIC BLOOD PRESSURE: 74 MMHG | HEART RATE: 67 BPM | OXYGEN SATURATION: 94 % | SYSTOLIC BLOOD PRESSURE: 122 MMHG | TEMPERATURE: 98.4 F

## 2018-05-05 VITALS
SYSTOLIC BLOOD PRESSURE: 132 MMHG | HEART RATE: 76 BPM | RESPIRATION RATE: 18 BRPM | TEMPERATURE: 97.6 F | DIASTOLIC BLOOD PRESSURE: 93 MMHG | OXYGEN SATURATION: 94 %

## 2018-05-05 VITALS — HEART RATE: 67 BPM

## 2018-05-05 VITALS — HEART RATE: 62 BPM

## 2018-05-05 VITALS
RESPIRATION RATE: 18 BRPM | HEART RATE: 73 BPM | OXYGEN SATURATION: 96 % | TEMPERATURE: 97.8 F | SYSTOLIC BLOOD PRESSURE: 117 MMHG | DIASTOLIC BLOOD PRESSURE: 75 MMHG

## 2018-05-05 LAB
ALBUMIN SERPL-MCNC: 2.6 GM/DL (ref 3.4–5)
ALP SERPL-CCNC: 121 U/L (ref 45–117)
ALT SERPL-CCNC: 33 U/L (ref 12–78)
AST SERPL-CCNC: 45 U/L (ref 15–37)
BASOPHILS # BLD AUTO: 0.1 TH/MM3 (ref 0–0.2)
BASOPHILS NFR BLD: 1.4 % (ref 0–2)
BILIRUB SERPL-MCNC: 0.5 MG/DL (ref 0.2–1)
BUN SERPL-MCNC: 14 MG/DL (ref 7–18)
CALCIUM SERPL-MCNC: 7.7 MG/DL (ref 8.5–10.1)
CHLORIDE SERPL-SCNC: 112 MEQ/L (ref 98–107)
CREAT SERPL-MCNC: 1 MG/DL (ref 0.6–1.3)
EOSINOPHIL # BLD: 0.5 TH/MM3 (ref 0–0.4)
EOSINOPHIL NFR BLD: 5.5 % (ref 0–4)
ERYTHROCYTE [DISTWIDTH] IN BLOOD BY AUTOMATED COUNT: 13.8 % (ref 11.6–17.2)
GFR SERPLBLD BASED ON 1.73 SQ M-ARVRAT: 79 ML/MIN (ref 89–?)
GLUCOSE SERPL-MCNC: 74 MG/DL (ref 74–106)
HCO3 BLD-SCNC: 22.2 MEQ/L (ref 21–32)
HCT VFR BLD CALC: 51.1 % (ref 39–51)
HGB BLD-MCNC: 17.4 GM/DL (ref 13–17)
LYMPHOCYTES # BLD AUTO: 3 TH/MM3 (ref 1–4.8)
LYMPHOCYTES NFR BLD AUTO: 34.4 % (ref 9–44)
MCH RBC QN AUTO: 30.7 PG (ref 27–34)
MCHC RBC AUTO-ENTMCNC: 34 % (ref 32–36)
MCV RBC AUTO: 90.2 FL (ref 80–100)
MONOCYTE #: 1.4 TH/MM3 (ref 0–0.9)
MONOCYTES NFR BLD: 16.6 % (ref 0–8)
NEUTROPHILS # BLD AUTO: 3.6 TH/MM3 (ref 1.8–7.7)
NEUTROPHILS NFR BLD AUTO: 42.1 % (ref 16–70)
PLATELET # BLD: 30 TH/MM3 (ref 150–450)
PMV BLD AUTO: 10.3 FL (ref 7–11)
PROT SERPL-MCNC: 5.8 GM/DL (ref 6.4–8.2)
RBC # BLD AUTO: 5.67 MIL/MM3 (ref 4.5–5.9)
SODIUM SERPL-SCNC: 141 MEQ/L (ref 136–145)
TROPONIN I SERPL-MCNC: (no result) NG/ML (ref 0.02–0.05)
WBC # BLD AUTO: 8.7 TH/MM3 (ref 4–11)

## 2018-05-05 RX ADMIN — HYDROCODONE BITARTRATE AND ACETAMINOPHEN PRN TAB: 10; 325 TABLET ORAL at 14:15

## 2018-05-05 RX ADMIN — DOLUTEGRAVIR SODIUM SCH MG: 50 TABLET, FILM COATED ORAL at 10:30

## 2018-05-05 RX ADMIN — ABACAVIR SULFATE SCH MG: 300 TABLET, FILM COATED ORAL at 10:30

## 2018-05-05 RX ADMIN — SODIUM CHLORIDE AND POTASSIUM CHLORIDE SCH MLS/HR: 9; 1.49 INJECTION, SOLUTION INTRAVENOUS at 05:11

## 2018-05-05 RX ADMIN — SODIUM CHLORIDE AND POTASSIUM CHLORIDE SCH MLS/HR: 9; 1.49 INJECTION, SOLUTION INTRAVENOUS at 18:49

## 2018-05-05 RX ADMIN — NICOTINE SCH PATCH: 21 PATCH, EXTENDED RELEASE TOPICAL at 14:39

## 2018-05-05 RX ADMIN — STANDARDIZED SENNA CONCENTRATE AND DOCUSATE SODIUM SCH TAB: 8.6; 5 TABLET, FILM COATED ORAL at 10:30

## 2018-05-05 RX ADMIN — STANDARDIZED SENNA CONCENTRATE AND DOCUSATE SODIUM SCH TAB: 8.6; 5 TABLET, FILM COATED ORAL at 20:33

## 2018-05-05 RX ADMIN — HYDROCODONE BITARTRATE AND ACETAMINOPHEN PRN TAB: 10; 325 TABLET ORAL at 22:54

## 2018-05-05 RX ADMIN — BUDESONIDE AND FORMOTEROL FUMARATE DIHYDRATE SCH PUFF: 160; 4.5 AEROSOL RESPIRATORY (INHALATION) at 20:33

## 2018-05-05 RX ADMIN — HYDROCODONE BITARTRATE AND ACETAMINOPHEN PRN TAB: 10; 325 TABLET ORAL at 18:49

## 2018-05-05 NOTE — HHI.PR
Subjective


Remarks


Follow up for syncope, hematochezia, thrombocytopenia. The patient reports no 

BM overnight so far. He reports some dizziness upon standing, but denies any 

near syncopal episodes since his arrival. Denies any chest pain or shortness of 

breath. He states his abdomen is always sore but is slightly worse recently 

across bilateral upper quadrants. Denies nausea or vomiting. He tolerated oral 

intake last night. He has no other medical complaints at this time.





Objective


Vitals





Vital Signs








  Date Time  Temp Pulse Resp B/P (MAP) Pulse Ox O2 Delivery O2 Flow Rate FiO2


 


5/5/18 11:50 98.0 69 19 127/72 (90) 93   


 


5/5/18 10:20  62      


 


5/5/18 08:33 97.8 73 18 117/75 (89) 96   


 


5/5/18 04:21 97.9 56 16 112/76 (88) 93   


 


5/5/18 02:20  62      


 


5/4/18 22:48 97.9 61 16 118/74 (89) 95   


 


5/4/18 20:16 97.9 78 16 107/74 (85) 95   


 


5/4/18 19:21     98   


 


5/4/18 17:29 97.9 68 18 109/79 (89) 96   


 


5/4/18 17:11     95   21


 


5/4/18 16:31        


 


5/4/18 14:56  78 16 116/72 (87) 95 Room Air  














I/O      


 


 5/4/18 5/4/18 5/4/18 5/5/18 5/5/18 5/5/18





 07:00 15:00 23:00 07:00 15:00 23:00


 


Intake Total   1000 ml   


 


Output Total     750 ml 


 


Balance   1000 ml  -750 ml 


 


      


 


Intake IV Total   1000 ml   


 


Output Urine Total     750 ml 








Result Diagram:  


5/5/18 0712                                                                    

            5/5/18 0712





Imaging





Last Impressions








Abdomen/Pelvis CT 5/4/18 1254 Signed





Impressions: 





 Service Date/Time:  Friday, May 4, 2018 13:59 - CONCLUSION:  1. No acute 

finding 





 is identified on this noncontrast examination to explain the clinical 

symptoms. 





 2. Mild atherosclerotic disease.     Hua Elliott MD 


 


Chest X-Ray 5/4/18 1222 Signed





Impressions: 





 Service Date/Time:  Friday, May 4, 2018 12:37 - CONCLUSION:  Stable appearance 





 with no acute disease.       Saul Ulloa MD 


 


Head CT 5/4/18 0000 Signed





Impressions: 





 Service Date/Time:  Friday, May 4, 2018 13:57 - CONCLUSION:  1. No acute 





 intracranial abnormality identified.     Mateo Yeung MD 








Objective Remarks


GENERAL: Well-nourished, well-developed middle aged male patient in [facility].


SKIN: Warm and dry. No rash.


HEENT:  Normocephalic. Atraumatic.Pupils equal and round. Mucous membranes pink 

and moist.


CARDIOVASCULAR: Regular rate and rhythm. No murmur appreciated. 


RESPIRATORY: No accessory muscle use. Clear to auscultation. Breath sounds 

equal bilaterally.  


GASTROINTESTINAL: Abdomen soft, nondistended, diffuse TTP, worse at bilateral 

upper quadrants. Normoactive bowel sounds x4.


MUSCULOSKELETAL: No obvious deformities. Extremities without clubbing, cyanosis

, or edema. 


NEUROLOGICAL: Awake and alert. No obvious cranial nerve deficits.  Motor 

grossly within normal limits.  Normal speech.


PSYCHIATRIC: Appropriate mood and affect; insight and judgment normal.


Medications and IVs





Current Medications








 Medications


  (Trade)  Dose


 Ordered  Sig/[place]


 Route  Start Time


 Stop Time Status Last Admin


 


  (NS Flush)  2 ml  UNSCH  PRN


 IVF  5/4/18 12:30


     


 


 


  (NS Flush)  2 ml  UNSCH  PRN


 IVF  5/4/18 12:30


     


 


 


  (Zofran Inj)  4 mg  Q6H  PRN


 IVP  5/4/18 16:15


     


 


 


  (Narcan Inj)  0.4 mg  UNSCH  PRN


 IV PUSH  5/4/18 16:15


     


 


 


  (Vivien-Colace)  1 tab  BID


 PO  5/4/18 21:00


    5/5/18 10:30


 


 


  (Milk Of


 Magnesia Liq)  30 ml  Q12H  PRN


 PO  5/4/18 16:15


     


 


 


  (Senokot)  17.2 mg  Q12H  PRN


 PO  5/4/18 16:15


     


 


 


  (Dulcolax Supp)  10 mg  DAILY  PRN


 RECTAL  5/4/18 16:15


     


 


 


  (Lactulose Liq)  30 ml  DAILY  PRN


 PO  5/4/18 16:15


     


 


 


 Potassium


 Chloride/Sodium


 Chloride  1,000 ml @ 


 84 mls/hr  [id number]


 IV  5/4/18 16:15


    5/5/18 05:11


 


 


  (Epivir)  300 mg  DAILY


 PO  5/5/18 09:00


    5/5/18 10:30


 


 


  (Ziagen)  600 mg  DAILY


 PO  5/5/18 09:00


    5/5/18 10:30


 











A/P


Problem List:  


(1) Acute kidney injury


ICD Code:  N17.9 - Acute kidney failure, unspecified


(2) Abdominal pain


ICD Code:  R10.9 - Unspecified abdominal pain


(3) Hematochezia


ICD Code:  K92.1 - Hematochezia


Status:  Acute


(4) Chronic ITP (idiopathic thrombocytopenia)


ICD Code:  D69.3 - Immune thrombocytopenic purpura


Status:  Acute


(5) Syncope


ICD Code:  R55 - Syncope and collapse


Status:  Acute


(6) Hepatitis C


ICD Code:  B19.20 - Hepatitis C


Status:  Chronic


(7) HIV (human immunodeficiency virus infection)


ICD Code:  Z21 - HIV (human immunodeficiency virus infection)


Status:  Chronic


(8) Tobacco use


ICD Code:  Z72.0 - Tobacco use


Status:  Chronic


Assessment and Plan


48 year-old male with history of HIV, Hepatitis C, Chronic thrombocytopenia, s/

p splenectomy, COPD, arthritis, anxiety, presents for syncope and hematochezia 

  





Syncopal episode: Suspect secondary to dehydration however rule out other 

etiologies such as ACS, cardiomyopathy, arrhythmia


   -Head CT images reviewed, now acute findings


   -ACS ruled out with negative serial cardiac enzymes x3 and EKG without acute 

ST changes


   -Monitor on telemetry


   -Check echocardiogram. 


   -Give IVF hydration





Abdominal Pain/Hematochezia/Weight Loss: Patient reports bright red blood with 

bowel movements.  Rectal exam in the ED revealed external hemorrhoids however 

hemoccult negative. 


   -Abd/pelvis CT reviewed, no acute findings


   -Monitor CBC, currently stable with Hgb 18.3 --> 17.4


   -check stool hemoccult


   -Consult GI, appreciate recommendations





Acute kidney injury: Patient appears to be dehydrated.  He has had poor oral 

intake over the past week with decreased appetite, nausea, abdominal pain, and 

weight loss.  


   -Continue IV fluids. 


   -Avoid nephrotoxins


   -Creatinine improved 1.36 --> 1.00


   -Continue to monitor





Thrombocytopenia: Patient reports chronic ITP.  


   -Monitor labs. 


   -Monitor for worsening bleeding episodes.


   -Consulted hematology





HIV: Chronic


   -continue home medications.





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  


   -hepatitis profile ordered


   -GI consultation requested.





Polysubstance abuse: He admits to marijuana and cocaine use.  


   -Patient was counseled.





Tobacco abuse: chronic


   -Patient was counseled regarding smoking cessation.





DVT prophylaxis: SCDs, ULICES hose.  Avoid chemical prophylaxis secondary to 

hematochezia, thrombocytopenia.





Problem Qualifiers





(1) Syncope:  


Qualified Codes:  R55 - Syncope and collapse








Marilyn Macias PA-C May 5, 2018 12:29 pm

## 2018-05-05 NOTE — MB
cc:

Chi Johnson MD, Richard MD

****

 

 

DATE:

05/05/2018

 

REASON FOR CONSULTATION:

1.  Idiopathic thrombocytopenic purpura.

2.  Previous history of HIV positivity.

3.  Hepatitis C.

4.  History of noncompliance.

5.  Syncope.

6.  Recent gastrointestinal bleeding.

7.  Drug use.

 

PATIENT PROFILE:

The patient is a 48-year-old white male.  He is currently not .

He has a fiance and will  in the next 1 or 2 months.  He has a 

daughter.  He was born in [place], [place].  He is currently 

living with his fiancee.  He is on disability.  He spent 21 months in 

nursing home for assault and was released on 03/08/2018.  He smokes a pack of

cigarettes per day.  In the past he drank alcohol, but currently has 

only a rare drink.  He had used drugs extensively in the past. 

He rarely  uses marijuana although several days ago used  

cocaine on one occasion to help sustain an erection.

 

HISTORY OF PRESENT ILLNESS:

The patient is well known to the Hematology/Oncology group.  He was 

last seen by Dr. Richardson in 09/2016.  He has had ITP and has had 

multiple treatments.  There was discussion about treating him with 

Nplate.  The patient indicates that he may have received Nplate, but 

it did not help much.  It is unclear to me what happened as there were

no followup appointments after this and he ended up returning to the 

nursing home.

 

His immediate problem dates back  to 5 or 6 days.  He has had between 5 and 6 

episodes of syncope over the past five days with slight lightheadedness before 
syncope.  

He has not had any injuries related to the fall.  He denies chest pain

or palpitations.  He has had a slight amount of rectal bleeding on 2 

occasions  The blood has been bright red.  For this reason, he came to

the emergency room on 05/04/2018. Hemoglobin 18.3, hematocrit 53.  

White count 11,800, and platelets of 32,000.  On 05/05 hemoglobin 

17.4, white count 8700, and platelets are 30,000.  In looking back 

over previous records, the last platelet count I have is dated 09/2016

and is 16,000.

 

He tells me that his HIV is under control and his viral load is 0.  He

tells me that his hepatitis C is not under control.  He was told in 

the nursing home that he his viral load was  1.5 million and he was told that he 
would 

in the future need treatment.

 

Since admission, he has undergone an abdominal and pelvic CT scan.  

There are multiple clips present in the left upper quadrant.  He has 

had a previous splenectomy.  There is residual splenic tissue in the 

left upper quadrant, which is stable from a prior exam.  The liver is 

homogeneous without lesions.  There was no mention of any cirrhosis.  

Other studies include a CMP on 05/05/2018.  Electrolytes, BUN and 

creatinine are normal.  AST 45, ALT 33, alkaline phosphatase 121, 

albumin is 2.6.  His current ethyl alcohol level on 05/04 is 29.  PT 

is 10.9, PTT is 31.

 

PAST SURGICAL HISTORY:

1.  Splenectomy.

2.  Left ankle surgery.

 

PAST MEDICAL HISTORY:

1.  HIV which the patient states is under control with a 0 viral load 

and he indicates that his CD4 count is approximately 780,

2.  ITP.

3.  Hepatitis C, which he indicates is not under control.

3.  Anxiety.

4.  Arthritis.

5.  COPD.

6.  Tobacco use.

7.  Noncompliance.

8.  Drug use.

 

ALLERGIES:

NO KNOWN ALLERGIES TO MEDICATIONS.

 

FAMILY HISTORY:

Noncontributory.

 

MEDICATIONS PRIOR TO ADMISSION:

1.  Combination of Bzrfhxnr-Kotvqioacmgb-Fgzhgwdhtk

2.  Albuterol

3.  Flexeril.

4.  Valium

5.  Advair inhaler.

6.  [place].

 

REVIEW OF SYSTEMS:

CONSTITUTIONAL:  No visual or hearing problems.  No chest pain, 

palpitations.  The patient has had 5 or 6 episodes of syncope with 

lightheadedness.

RESPIRATORY:  No shortness of breath.

GASTROINTESTINAL:  Notable for 2 episodes of hematochezia.

GENITOURINARY:  No dysuria or frequency.

MUSCULOSKELETAL:  Pain in the left ankle.

NEUROLOGIC:  No focal weakness.

PSYCHIATRIC:  Denies depression.

SKIN:  Some excoriations of skin.

 

PHYSICAL EXAMINATION:

GENERAL:  Reveals  a gentleman in no acute distress.

VITAL SIGNS:  Blood pressure 120/80, respiratory rate 18, pulse 66, 

temperature 99.1, O2 saturation 93%.

HEENT:  Head is normocephalic.  Sclerae and conjunctivae are normal.  

Oropharynx - No mucosal lesions.

NECK:  There is no cervical, supraclavicular, axillary or inguinal 

adenopathy.

HEART:  Regular rhythm.

LUNGS:  Clear without rales, wheezes or rhonchi.

ABDOMEN:  Soft.  Liver I believe is 2 cm below the right costal 

margin.  Mild tenderness.  No palpable splenomegaly.  The patient has 

had a previous splenectomy.

EXTREMITIES:  No edema.

MUSCULOSKELETAL:  No bone pain.

NEUROLOGIC:  No weakness.  Cognition and affect are unremarkable.

SKIN:  Few areas of excoriation over the lower extremities.

 

ASSESSMENT AND PLAN:

1.  The patient has a history of idiopathic thrombocytopenia purpura. 

He has chronic thrombocytopenia.  His platelet count has been 32,000 

and 30,000 since admission.  At this level, he does not require 

treatment.  He has had a slight amount of hematochezia.  He cannot 

have had much bleeding as his current hemoglobin is 17.4, hematocrit 

is 51; both of these values are elevated.

2.  He is human immunodeficiency virus positive. He indicates that he 

does not have any viral load and he is currently on treatement.

3.  He tells me that he has hepatitis C, which is active and testing 

has been ordered.  Active hepatitis C can contribute to his 

thrombocytopenia and this will need to be dealt with by either 

gastroenterology or infectious disease.

4.  Of note, on the CAT scan of the abdomen he has a residual splenic 

tissue and if he developed refractory thrombocytopenia, removal of 

this tissue would be an option.

5.  He continues to smoke and he needs to stop.

6.  He continues to use drugs intermittently.  He needs to stop.

7.  Given the hematochezia, it would be appropriate to at least 

consider doing a sigmoidoscopy or colonoscopy. He can followup with 

Dr. Richardson regarding his idiopathic thrombocytopenic purpura.  

Presently, with a platelet count of 30,000 I do feel that he needs 

treatment.

 

 

__________________________________

MD SORIN Guzman/

D: 05/05/2018, 05:28 PM

T: 05/05/2018, 06:18 PM

Visit #: [id number]

Job #: [id number]

AILEEN

## 2018-05-05 NOTE — PD.CONS
HPI


History of Present Illness


This is a 48 year old M with PMH significant for HIV with reports of 

undetectable viral load, Hep C naive to treatment, cirrhosis, chronic ITP with 

history of splenectomy, and history of GIB.  Pt presented to the ER yesterday 

by [facility] following a syncopal episode at home, pt reports having multiple dizzy 

episodes and syncopal episodes over the past couple days.  At this time pt is 

complaining of pain to his RUQ area that he states is his liver hurting.  

States pain is chronic described as aching with intermittent sharp pain. Denies 

pain in relation to PO intake. He has known cirrhosis and Hepatitis C, 

treatment naive.  Also complaining of chronic nausea, denies emesis.  Reports 

he has been having BRB in his stool for the past couple days, has had this 

issue in the past when his platelets are really low.  Was previously followed 

by hematology and has been on Nplate and IVIG in the past.  Our service has 

previously followed pt for same and notes that rectal bleeding resolved with 

resolution of platelet count.  Pt also complaining of a 10 lb weight loss over 

the past two weeks with decrease in appetite.  Of note, pt released from prison 

on March 8th after 21 month sentence.  Reports history of IVDU, has 2 tattoos 

both done in prison.  Admits to continued ETOH use, states has some beer once a 

week, last ETOH intake was two nights ago. Currently smokes 1 PPD. Also admits 

to marijuana use, denies any other illicit drugs. Denies family history of 

colon cancer. 


 (Ann Dumas)





PFSH


Past Medical History


HIV


Thrombocytopenia


Hepatitis C


Anxiety


Arthritis


COPD


Past Surgical History


Splenectomy


Left ankle surgery


 (Ann Dumas)


Coded Allergies:  


     bee venom protein (honey bee) (Verified  Allergy, Severe, Anaphylaxis, 5/4/ 18)


     No Known Allergies (Unverified  Allergy, Unknown, 5/4/18)


Family History


Denies significant family medical history


Social History


Smokes 1 pack per day.  Admits to marijuana use.   Reports a remote history of 

IV drug abuse, but not for "many years".  Reports alcohol use, once a week. 


 (Ann Dumas)





Review of Systems


Gastrointestinal:  COMPLAINS OF: Abdominal pain, Bloody stools, Nausea, DENIES: 

Black stools, Constipation, Diarrhea, Vomiting, Difficulty Swallowing, 

Odynophagia, Swelling of Abdomen, Heartburn, Hematemesis (Ann Dumas)





GI Exam


Vitals I&O





Vital Signs








  Date Time  Temp Pulse Resp B/P (MAP) Pulse Ox O2 Delivery O2 Flow Rate FiO2


 


5/5/18 08:33 97.8 73 18 117/75 (89) 96   


 


5/5/18 04:21 97.9 56 16 112/76 (88) 93   


 


5/5/18 02:20  62      


 


5/4/18 22:48 97.9 61 16 118/74 (89) 95   


 


5/4/18 20:16 97.9 78 16 107/74 (85) 95   


 


5/4/18 19:21     98   


 


5/4/18 17:29 97.9 68 18 109/79 (89) 96   


 


5/4/18 17:11     95   21


 


5/4/18 16:31        


 


5/4/18 14:56  78 16 116/72 (87) 95 Room Air  


 


5/4/18 12:25     94 Room Air  


 


5/4/18 12:17 98.3 83 20 129/87 (101) 94   














I/O      


 


 5/4/18 5/4/18 5/4/18 5/5/18 5/5/18 5/5/18





 07:00 15:00 23:00 07:00 15:00 23:00


 


Intake Total   1000 ml   


 


Balance   1000 ml   


 


      


 


Intake IV Total   1000 ml   








Imaging





Last Impressions








Abdomen/Pelvis CT 5/4/18 1254 Signed





Impressions: 





 Service Date/Time:  Friday, May 4, 2018 13:59 - CONCLUSION:  1. No acute 

finding 





 is identified on this noncontrast examination to explain the clinical 

symptoms. 





 2. Mild atherosclerotic disease.     Hua Elliott MD 


 


Chest X-Ray 5/4/18 1222 Signed





Impressions: 





 Service Date/Time:  Friday, May 4, 2018 12:37 - CONCLUSION:  Stable appearance 





 with no acute disease.       Saul Ulloa MD 


 


Head CT 5/4/18 0000 Signed





Impressions: 





 Service Date/Time:  Friday, May 4, 2018 13:57 - CONCLUSION:  1. No acute 





 intracranial abnormality identified.     Mateo Yeung MD 








Laboratory











Test


  5/4/18


12:30 5/4/18


17:41 5/5/18


01:41 5/5/18


07:12


 


White Blood Count 11.8 TH/MM3    8.7 TH/MM3 


 


Red Blood Count 5.93 MIL/MM3    5.67 MIL/MM3 


 


Hemoglobin 18.3 GM/DL    17.4 GM/DL 


 


Hematocrit 53.5 %    51.1 % 


 


Mean Corpuscular Volume 90.3 FL    90.2 FL 


 


Mean Corpuscular Hemoglobin 30.9 PG    30.7 PG 


 


Mean Corpuscular Hemoglobin


Concent 34.2 % 


  


  


  34.0 % 


 


 


Red Cell Distribution Width 13.8 %    13.8 % 


 


Platelet Count 32 TH/MM3    30 TH/MM3 


 


Mean Platelet Volume 10.5 FL    10.3 FL 


 


Neutrophils (%) (Auto) 56.1 %    42.1 % 


 


Lymphocytes (%) (Auto) 27.7 %    34.4 % 


 


Monocytes (%) (Auto) 11.9 %    16.6 % 


 


Eosinophils (%) (Auto) 3.5 %    5.5 % 


 


Basophils (%) (Auto) 0.8 %    1.4 % 


 


Neutrophils # (Auto) 6.6 TH/MM3    3.6 TH/MM3 


 


Lymphocytes # (Auto) 3.3 TH/MM3    3.0 TH/MM3 


 


Monocytes # (Auto) 1.4 TH/MM3    1.4 TH/MM3 


 


Eosinophils # (Auto) 0.4 TH/MM3    0.5 TH/MM3 


 


Basophils # (Auto) 0.1 TH/MM3    0.1 TH/MM3 


 


CBC Comment AUTO DIFF    AUTO DIFF 


 


Differential Comment


  AUTO DIFF


CONFIRMED 


  


  AUTO DIFF


CONFIRMED


 


Prothrombin Time 10.9 SEC    


 


Prothromb Time International


Ratio 1.1 RATIO 


  


  


  


 


 


Activated Partial


Thromboplast Time 31.4 SEC 


  


  


  


 


 


Blood Urea Nitrogen 10 MG/DL    14 MG/DL 


 


Creatinine 1.36 MG/DL    1.00 MG/DL 


 


Random Glucose 72 MG/DL    74 MG/DL 


 


Total Protein 7.0 GM/DL    5.8 GM/DL 


 


Albumin 3.3 GM/DL    2.6 GM/DL 


 


Calcium Level 8.1 MG/DL    7.7 MG/DL 


 


Alkaline Phosphatase 141 U/L    121 U/L 


 


Aspartate Amino Transf


(AST/SGOT) 56 U/L 


  


  


  45 U/L 


 


 


Alanine Aminotransferase


(ALT/SGPT) 37 U/L 


  


  


  33 U/L 


 


 


Total Bilirubin 0.6 MG/DL    0.5 MG/DL 


 


Sodium Level 139 MEQ/L    141 MEQ/L 


 


Potassium Level 3.8 MEQ/L    3.9 MEQ/L 


 


Chloride Level 108 MEQ/L    112 MEQ/L 


 


Carbon Dioxide Level 18.6 MEQ/L    22.2 MEQ/L 


 


Anion Gap 12 MEQ/L    7 MEQ/L 


 


Estimat Glomerular Filtration


Rate 55 ML/MIN 


  


  


  79 ML/MIN 


 


 


Ammonia 37 MCMOL/L    


 


Total Creatine Kinase 267 U/L  211 U/L  176 U/L  


 


Creatine Kinase MB 5.4 NG/ML    


 


Troponin I


  LESS THAN 0.02


NG/ML LESS THAN 0.02


NG/ML LESS THAN 0.02


NG/ML 


 


 


Lipase 203 U/L    


 


Ethyl Alcohol Level 29 MG/DL    








Physical Examination


HEENT: Normocephalic; atraumatic


CHEST:  Even/unlabored


CARDIAC:  RRR


ABDOMEN:  Soft, nondistended, upper abdominal tenderness; bowel sounds active


EXTREMITIES: No clubbing, cyanosis, or edema.


SKIN:  Normal; no rash; no jaundice.


CNS:  No focal deficits; alert and oriented times three.


 (Ann Dumas)





Assessment and Plan


Plan


Assessment:


- Hematochezia- reports of BRBPR mixed in the stool for the past couple days. 

History


  of GIB secondary to low platelets.  States last colonoscopy was done at this 

facility


  3 years ago, I can not find these records in his chart. 


  H/H currently 17.4/51.1


  Platelets- 30


  INR-1.1 


- Chronic ITP- Previously followed by hematology and has been on Nplate and 

IVIG in


  the past, per previous records pt has been noncompliant with treatment.


  History of splenectomy


- Cirrhosis with hepatitis C, treatment naive 


  History of IVDU, states no use in multiple years. Has 2 tattoos, both done in 

prison.


  Denies high risk sexual behavior.


  Still drinks ETOH, reports once a week, admits to last ETOH was 2 nights ago


- Weight loss- reports 10 lbs over the past two weeks, unintentional but 

reports a poor


  appetite 


  CT abdomen and pelvis W/O IV contrast (5/4) --> No acute findings are 

identified on this


  noncontrast examination to explain the clinical symptoms. Mild 

arteriosclerotic disease. 


  Liver: Homogeneous density without lesion.  There is no dilation of the 

biliary tree.  No 


  calcified gallstones.


- HIV- reports recent viral load and was undetectable


- Syncopal episodes- reports multiple syncopal episodes over the past couple 

days. Head


  CT negative 


  Ammonia-37 








Plan:


Hepatitis panel


Hep C genotype and quant


Hematology consult to address platelets


At this time no endoscopic procedures, will await hematology recommendations,


  possible scopes early next week pending recommendations and clinical course


Monitor H/H


Transfuse as needed


AFP


Further recommendations based on clinical course





Pt has been seen and examined by myself and Dr. Ortiz and this note is written 

on her behalf 


 (Ann Dumas)


Physician Comments


seen, examined


agree with above


await hematology eval 


egd/colon monday


cryoglobulins 


 (Genet Ortiz MD)











Ann Dumas May 5, 2018 10:15


Genet Ortiz MD May 5, 2018 17:56

## 2018-05-06 VITALS
HEART RATE: 78 BPM | DIASTOLIC BLOOD PRESSURE: 68 MMHG | SYSTOLIC BLOOD PRESSURE: 128 MMHG | TEMPERATURE: 98.2 F | OXYGEN SATURATION: 98 % | RESPIRATION RATE: 20 BRPM

## 2018-05-06 VITALS
HEART RATE: 62 BPM | TEMPERATURE: 97.1 F | SYSTOLIC BLOOD PRESSURE: 110 MMHG | RESPIRATION RATE: 18 BRPM | OXYGEN SATURATION: 95 % | DIASTOLIC BLOOD PRESSURE: 71 MMHG

## 2018-05-06 VITALS
HEART RATE: 78 BPM | RESPIRATION RATE: 20 BRPM | TEMPERATURE: 98.2 F | OXYGEN SATURATION: 98 % | DIASTOLIC BLOOD PRESSURE: 70 MMHG | SYSTOLIC BLOOD PRESSURE: 124 MMHG

## 2018-05-06 VITALS — HEART RATE: 59 BPM

## 2018-05-06 VITALS — HEART RATE: 57 BPM

## 2018-05-06 VITALS
RESPIRATION RATE: 20 BRPM | HEART RATE: 60 BPM | OXYGEN SATURATION: 96 % | DIASTOLIC BLOOD PRESSURE: 90 MMHG | SYSTOLIC BLOOD PRESSURE: 120 MMHG | TEMPERATURE: 97.9 F

## 2018-05-06 LAB
ALBUMIN SERPL-MCNC: 2.9 GM/DL (ref 3.4–5)
ALP SERPL-CCNC: 128 U/L (ref 45–117)
ALT SERPL-CCNC: 32 U/L (ref 12–78)
AST SERPL-CCNC: 41 U/L (ref 15–37)
BASOPHILS # BLD AUTO: 0.1 TH/MM3 (ref 0–0.2)
BASOPHILS NFR BLD: 1.3 % (ref 0–2)
BILIRUB SERPL-MCNC: 0.6 MG/DL (ref 0.2–1)
BUN SERPL-MCNC: 13 MG/DL (ref 7–18)
CALCIUM SERPL-MCNC: 8.4 MG/DL (ref 8.5–10.1)
CHLORIDE SERPL-SCNC: 110 MEQ/L (ref 98–107)
CREAT SERPL-MCNC: 1.12 MG/DL (ref 0.6–1.3)
EOSINOPHIL # BLD: 0.4 TH/MM3 (ref 0–0.4)
EOSINOPHIL NFR BLD: 4.5 % (ref 0–4)
ERYTHROCYTE [DISTWIDTH] IN BLOOD BY AUTOMATED COUNT: 13.6 % (ref 11.6–17.2)
GFR SERPLBLD BASED ON 1.73 SQ M-ARVRAT: 69 ML/MIN (ref 89–?)
GLUCOSE SERPL-MCNC: 98 MG/DL (ref 74–106)
HCO3 BLD-SCNC: 26.3 MEQ/L (ref 21–32)
HCT VFR BLD CALC: 52 % (ref 39–51)
HGB BLD-MCNC: 17.6 GM/DL (ref 13–17)
LYMPHOCYTES # BLD AUTO: 2.4 TH/MM3 (ref 1–4.8)
LYMPHOCYTES NFR BLD AUTO: 23.8 % (ref 9–44)
MCH RBC QN AUTO: 30.5 PG (ref 27–34)
MCHC RBC AUTO-ENTMCNC: 33.9 % (ref 32–36)
MCV RBC AUTO: 90.1 FL (ref 80–100)
MONOCYTE #: 1.2 TH/MM3 (ref 0–0.9)
MONOCYTES NFR BLD: 12.4 % (ref 0–8)
NEUTROPHILS # BLD AUTO: 5.8 TH/MM3 (ref 1.8–7.7)
NEUTROPHILS NFR BLD AUTO: 58 % (ref 16–70)
PLATELET # BLD: 38 TH/MM3 (ref 150–450)
PMV BLD AUTO: 10.8 FL (ref 7–11)
PROT SERPL-MCNC: 6.5 GM/DL (ref 6.4–8.2)
RBC # BLD AUTO: 5.77 MIL/MM3 (ref 4.5–5.9)
SODIUM SERPL-SCNC: 141 MEQ/L (ref 136–145)
WBC # BLD AUTO: 10 TH/MM3 (ref 4–11)

## 2018-05-06 RX ADMIN — STANDARDIZED SENNA CONCENTRATE AND DOCUSATE SODIUM SCH TAB: 8.6; 5 TABLET, FILM COATED ORAL at 08:15

## 2018-05-06 RX ADMIN — HYDROCODONE BITARTRATE AND ACETAMINOPHEN PRN TAB: 10; 325 TABLET ORAL at 03:01

## 2018-05-06 RX ADMIN — STANDARDIZED SENNA CONCENTRATE AND DOCUSATE SODIUM SCH TAB: 8.6; 5 TABLET, FILM COATED ORAL at 21:48

## 2018-05-06 RX ADMIN — SODIUM CHLORIDE AND POTASSIUM CHLORIDE SCH MLS/HR: 9; 1.49 INJECTION, SOLUTION INTRAVENOUS at 16:19

## 2018-05-06 RX ADMIN — NICOTINE SCH PATCH: 21 PATCH, EXTENDED RELEASE TOPICAL at 08:16

## 2018-05-06 RX ADMIN — DOLUTEGRAVIR SODIUM SCH MG: 50 TABLET, FILM COATED ORAL at 08:15

## 2018-05-06 RX ADMIN — BUDESONIDE AND FORMOTEROL FUMARATE DIHYDRATE SCH PUFF: 160; 4.5 AEROSOL RESPIRATORY (INHALATION) at 21:48

## 2018-05-06 RX ADMIN — ABACAVIR SULFATE SCH MG: 300 TABLET, FILM COATED ORAL at 08:15

## 2018-05-06 RX ADMIN — BUDESONIDE AND FORMOTEROL FUMARATE DIHYDRATE SCH PUFF: 160; 4.5 AEROSOL RESPIRATORY (INHALATION) at 08:15

## 2018-05-06 RX ADMIN — SODIUM CHLORIDE AND POTASSIUM CHLORIDE SCH MLS/HR: 9; 1.49 INJECTION, SOLUTION INTRAVENOUS at 04:07

## 2018-05-06 RX ADMIN — HYDROCODONE BITARTRATE AND ACETAMINOPHEN PRN TAB: 10; 325 TABLET ORAL at 13:20

## 2018-05-06 RX ADMIN — HYDROCODONE BITARTRATE AND ACETAMINOPHEN PRN TAB: 10; 325 TABLET ORAL at 17:25

## 2018-05-06 RX ADMIN — HYDROCODONE BITARTRATE AND ACETAMINOPHEN PRN TAB: 10; 325 TABLET ORAL at 21:48

## 2018-05-06 NOTE — HHI.GIFU
Subjective


Remarks


Pt reports continued chronic upper abdominal pain today


Reports of nausea, denies emesis


Ate his entire breakfast


No BM since I saw him yesterday 


 (Ann Dumas)





Objective


Vitals I&O





Vital Signs








  Date Time  Temp Pulse Resp B/P (MAP) Pulse Ox O2 Delivery O2 Flow Rate FiO2


 


5/6/18 07:59  57      


 


5/6/18 07:37 98.2 78 20 124/70 (88) 98   


 


5/6/18 04:01   12     


 


5/6/18 04:01 97.1 62 18 110/71 (84) 95   


 


5/6/18 04:00  59      


 


5/5/18 23:29 97.6 76 18 132/93 (106) 94   


 


5/5/18 20:31 98.4 67 18 122/74 (90) 94   


 


5/5/18 19:55        21


 


5/5/18 16:08 99.1 66 18 116/72 (87) 93   





    124/85 (98)    





    126/79 (95)    


 


5/5/18 15:55  67      


 


5/5/18 11:50 98.0 69 19 127/72 (90) 93   


 


5/5/18 10:20  62      














I/O      


 


 5/5/18 5/5/18 5/5/18 5/6/18 5/6/18 5/6/18





 07:00 15:00 23:00 07:00 15:00 23:00


 


Output Total  750 ml 1000 ml   


 


Balance  -750 ml -1000 ml   


 


      


 


Output Urine Total  750 ml 1000 ml   








Laboratory





Laboratory Tests








Test


  5/5/18


12:42


 


Tumor Marker Alpha Fetoprotein 3.5 


 


Hepatitis A IgM Antibody NONREACTIVE 


 


Hepatitis B Surface Antigen NONREACTIVE 


 


Hepatitis B Core IgM Antibody NONREACTIVE 


 


Hepatitis C IgG Antibody REACTIVE 








Imaging





Last Impressions








Abdomen/Pelvis CT 5/4/18 1254 Signed





Impressions: 





 Service Date/Time:  Friday, May 4, 2018 13:59 - CONCLUSION:  1. No acute 

finding 





 is identified on this noncontrast examination to explain the clinical 

symptoms. 





 2. Mild atherosclerotic disease.     Hua Elliott MD 


 


Chest X-Ray 5/4/18 1222 Signed





Impressions: 





 Service Date/Time:  Friday, May 4, 2018 12:37 - CONCLUSION:  Stable appearance 





 with no acute disease.       Saul Ulloa MD 


 


Head CT 5/4/18 0000 Signed





Impressions: 





 Service Date/Time:  Friday, May 4, 2018 13:57 - CONCLUSION:  1. No acute 





 intracranial abnormality identified.     Mateo Yeung MD 








Physical Exam


HEENT: Normocephalic; atraumatic


CHEST:  Even/unlabored 


CARDIAC:  RRR


ABDOMEN:  Soft, mildly distended, upper abdominal tenderness, bowel sounds 

active 


EXTREMITIES: No clubbing, cyanosis, or edema.


SKIN:  Normal; no rash; no jaundice.


CNS:  No focal deficits; alert and oriented times three.


 (Ann Dumas)





Assessment and Plan


Plan


Assessment:


- Hematochezia- reports of BRBPR mixed in the stool for the past couple days. 

History


  of GIB secondary to low platelets.  States last colonoscopy was done at this 

facility


  3 years ago, I can not find these records in his chart. 


  H/H currently 17.4/51.1


  Platelets- 30


  INR-1.1 


- Chronic ITP- Previously followed by hematology and has been on Nplate and 

IVIG in


  the past, per previous records pt has been noncompliant with treatment.


  History of splenectomy


- Cirrhosis with hepatitis C, treatment naive 


  History of IVDU, states no use in multiple years. Has 2 tattoos, both done in 

prison.


  Denies high risk sexual behavior.


  Still drinks ETOH, reports once a week, admits to last ETOH was 2 nights ago


  AFP- 3.5 


- Weight loss- reports 10 lbs over the past two weeks, unintentional but 

reports a poor


  appetite 


  CT abdomen and pelvis W/O IV contrast (5/4) --> No acute findings are 

identified on this


  noncontrast examination to explain the clinical symptoms. Mild 

arteriosclerotic disease. 


  Liver: Homogeneous density without lesion.  There is no dilation of the 

biliary tree.  No 


  calcified gallstones.


- HIV- reports recent viral load and was undetectable


- Syncopal episodes- reports multiple syncopal episodes over the past couple 

days. Head


  CT negative 


  Ammonia-37 





(5/6) Pt denies any BM or emesis since my exam yesterday. Repeat labs from 

today are


  still pending. Seen by hematology who has no plans on treating 

thrombocytopenia at 


  this time.  


  Complaining of chronic abdominal pain and nausea, did tolerate eating his 

whole breakfast.


  Hep C genotype and RNA pending.





Plan:


EGD/colonoscopy tomorrow


Need for platelet transfusion pending todays labs 


Obtain consent 


Clear liquids today


Golytely prep 


NPO after MN 


Hep C genotype and quant


Monitor H/H


Transfuse as needed


Further recommendations based on clinical course





Pt has been seen and examined by myself and Dr. Ortiz and this note is written 

on her behalf 


 (Ann Dumas)


Physician Comments


seen, examined


agree with above 


we will ask hematology about  preprocedure platelets transfusion 


 (Genet Ortiz MD)











Ann Dumas May 6, 2018 10:19


Genet Ortiz MD May 6, 2018 11:33

## 2018-05-06 NOTE — HHI.PR
Subjective


Remarks


Follow-up for syncope, hematochezia, thrombocytopenia.  Patient reports 

continued diffuse upper abdominal pain today, slightly improved compared to 

yesterday.  He reports occasional nausea but no vomiting.  He tolerated dinner 

last night.  Denies any fevers or chills.  He has not had a bowel movement 

since his arrival.  Denies any lightheadedness or dizziness.  Denies any chest 

pain or shortness of breath.  He has no other medical complaints at this time.





Objective


Vitals





Vital Signs








  Date Time  Temp Pulse Resp B/P (MAP) Pulse Ox O2 Delivery O2 Flow Rate FiO2


 


5/6/18 07:59  57      


 


5/6/18 07:37 98.2 78 20 124/70 (88) 98   


 


5/6/18 04:01   12     


 


5/6/18 04:01 97.1 62 18 110/71 (84) 95   


 


5/6/18 04:00  59      


 


5/5/18 23:29 97.6 76 18 132/93 (106) 94   


 


5/5/18 20:31 98.4 67 18 122/74 (90) 94   


 


5/5/18 19:55        21


 


5/5/18 16:08 99.1 66 18 116/72 (87) 93   





    124/85 (98)    





    126/79 (95)    


 


5/5/18 15:55  67      


 


5/5/18 11:50 98.0 69 19 127/72 (90) 93   


 


5/5/18 10:20  62      














I/O      


 


 5/5/18 5/5/18 5/5/18 5/6/18 5/6/18 5/6/18





 06:59 14:59 22:59 06:59 14:59 22:59


 


Output Total  750 ml 1000 ml   


 


Balance  -750 ml -1000 ml   


 


      


 


Output Urine Total  750 ml 1000 ml   








Result Diagram:  


5/5/18 0712                                                                    

            5/5/18 0712





Imaging





Last Impressions








Abdomen/Pelvis CT 5/4/18 1254 Signed





Impressions: 





 Service Date/Time:  Friday, May 4, 2018 13:59 - CONCLUSION:  1. No acute 

finding 





 is identified on this noncontrast examination to explain the clinical 

symptoms. 





 2. Mild atherosclerotic disease.     Hua Elliott MD 


 


Chest X-Ray 5/4/18 1222 Signed





Impressions: 





 Service Date/Time:  Friday, May 4, 2018 12:37 - CONCLUSION:  Stable appearance 





 with no acute disease.       Saul Ulloa MD 


 


Head CT 5/4/18 0000 Signed





Impressions: 





 Service Date/Time:  Friday, May 4, 2018 13:57 - CONCLUSION:  1. No acute 





 intracranial abnormality identified.     Mateo Yeung MD 








Objective Remarks


GENERAL: Well-nourished, well-developed middle aged male patient in [facility].


SKIN: Warm and dry. No rash.


HEENT:  Normocephalic. Atraumatic.Pupils equal and round. Mucous membranes pink 

and moist.


CARDIOVASCULAR: Regular rate and rhythm. No murmur appreciated. 


RESPIRATORY: No accessory muscle use. Clear to auscultation. Breath sounds 

equal bilaterally.  


GASTROINTESTINAL: Abdomen soft, nondistended, mild upper abdominal tenderness.  

Normoactive bowel sounds x4.


MUSCULOSKELETAL: No obvious deformities. Extremities without clubbing, cyanosis

, or edema. 


NEUROLOGICAL: Awake and alert. No obvious cranial nerve deficits.  Motor 

grossly within normal limits.  Normal speech.


PSYCHIATRIC: Appropriate mood and affect; insight and judgment normal.


Medications and IVs





Current Medications








 Medications


  (Trade)  Dose


 Ordered  Sig/[place]


 Route  Start Time


 Stop Time Status Last Admin


 


  (NS Flush)  2 ml  UNSCH  PRN


 IVF  5/4/18 12:30


     


 


 


  (NS Flush)  2 ml  UNSCH  PRN


 IVF  5/4/18 12:30


     


 


 


  (Zofran Inj)  4 mg  Q6H  PRN


 IVP  5/4/18 16:15


     


 


 


  (Narcan Inj)  0.4 mg  UNSCH  PRN


 IV PUSH  5/4/18 16:15


     


 


 


  (Vivien-Colace)  1 tab  BID


 PO  5/4/18 21:00


    5/6/18 08:15


 


 


  (Milk Of


 Magnesia Liq)  30 ml  Q12H  PRN


 PO  5/4/18 16:15


     


 


 


  (Senokot)  17.2 mg  Q12H  PRN


 PO  5/4/18 16:15


     


 


 


  (Dulcolax Supp)  10 mg  DAILY  PRN


 RECTAL  5/4/18 16:15


     


 


 


  (Lactulose Liq)  30 ml  DAILY  PRN


 PO  5/4/18 16:15


     


 


 


 Potassium


 Chloride/Sodium


 Chloride  1,000 ml @ 


 84 mls/hr  [id number]


 IV  5/4/18 16:15


    5/6/18 04:07


 


 


  (Epivir)  300 mg  DAILY


 PO  5/5/18 09:00


    5/6/18 08:15


 


 


  (Ziagen)  600 mg  DAILY


 PO  5/5/18 09:00


    5/6/18 08:15


 


 


  (Norco  5-325 Mg)  1 tab  Q4H  PRN


 PO  5/5/18 13:45


    5/6/18 08:16


 


 


  (Norco  Mg)  1 tab  Q4H  PRN


 PO  5/5/18 13:45


    5/6/18 03:01


 


 


  (Proair Hfa Inh)  1 puff  Q4H  PRN


 INH  5/5/18 13:45


     


 


 


  (Habitrol 21 Mg


 Patch.24 Hr)  1 patch  DAILY


 T-DERMAL  5/5/18 14:30


    5/6/18 08:16


 


 


 Miscellaneous


 Information  1  DAILY


 T-DERMAL  5/5/18 14:30


    5/6/18 08:16


 


 


  (Symbicort


 160-4.5 Mcg Inh)  2 puff  BID


 INH  5/5/18 21:00


    5/6/18 08:15


 


 


  (Colyte Liq)  4,000 ml  ONCE  ONCE


 PO  5/6/18 16:00


 5/6/18 16:01   


 


 


 Lactated Ringer's  1,000 ml @ 


 30 mls/hr  Q24H PRN


 IV  5/6/18 11:30


 5/9/18 11:29   


 


 


 Sodium Chloride  500 ml @ 


 30 mls/hr  [id number] PRN


 IV  5/6/18 11:30


 5/9/18 11:29   


 


 


  (Lopressor)  25 mg  ON CALL  PRN


 PO  5/6/18 11:30


 5/9/18 11:29   


 


 


  (Betadine 5%


 Antisepsis Kit)  1 applic  ON CALL  PRN


 EACH NARE  5/6/18 11:30


 5/9/18 11:29   


 


 


  (Chlorhexidine


 2% Cloth)  3 pack  ON CALL  PRN


 TOPICAL  5/6/18 11:30


 5/9/18 11:29   


 











A/P


Problem List:  


(1) Acute kidney injury


ICD Code:  N17.9 - Acute kidney failure, unspecified


(2) Abdominal pain


ICD Code:  R10.9 - Unspecified abdominal pain


(3) Hematochezia


ICD Code:  K92.1 - Hematochezia


Status:  Acute


(4) Chronic ITP (idiopathic thrombocytopenia)


ICD Code:  D69.3 - Immune thrombocytopenic purpura


Status:  Acute


(5) Syncope


ICD Code:  R55 - Syncope and collapse


Status:  Acute


(6) Hepatitis C


ICD Code:  B19.20 - Hepatitis C


Status:  Chronic


(7) HIV (human immunodeficiency virus infection)


ICD Code:  Z21 - HIV (human immunodeficiency virus infection)


Status:  Chronic


(8) Tobacco use


ICD Code:  Z72.0 - Tobacco use


Status:  Chronic


Assessment and Plan


48 year-old male with history of HIV, Hepatitis C, Chronic thrombocytopenia, s/

p splenectomy, COPD, arthritis, anxiety, presents for syncope and hematochezia 

  





Syncopal episode: Suspect secondary to dehydration however rule out other 

etiologies such as ACS, cardiomyopathy, arrhythmia


   -Head CT images reviewed, now acute findings


   -ACS ruled out with negative serial cardiac enzymes x3 and EKG without acute 

ST changes


   -Monitor on telemetry


   -Check echocardiogram. 


   -Give IVF hydration





Abdominal Pain/Hematochezia/Weight Loss: Patient reports bright red blood with 

bowel movements.  Rectal exam in the ED revealed external hemorrhoids however 

hemoccult negative. 


   -Abd/pelvis CT reviewed, no acute findings


   -Monitor CBC, currently stable with Hgb 18.3 --> 17.4


   -check stool hemoccult 


   -Consult GI, appreciate recommendations, plan for EGD/colonoscopy tomorrow





Acute kidney injury: Patient appears to be dehydrated.  He has had poor oral 

intake over the past week with decreased appetite, nausea, abdominal pain, and 

weight loss.  


   -Continue IV fluids. 


   -Avoid nephrotoxins


   -Creatinine improved 1.36 --> 1.00


   -Continue to monitor





Thrombocytopenia: Patient reports chronic ITP.  


   -Monitor labs. 


   -Monitor for worsening bleeding episodes.


   -Consulted hematology, appreciate recommendations, does not need treatment 

at this time





HIV: Chronic


   -continue home medications.





Hepatitis C with Cirrhosis: treatment naive


   -hepatitis profile ordered


   -GI consulted, appreciate recommendations





Polysubstance abuse: He admits to marijuana and cocaine use.   


   -Patient was counseled.





Tobacco abuse: chronic


   -Patient was counseled regarding smoking cessation.





DVT prophylaxis: SCDs, ULICES hose.  Avoid chemical prophylaxis secondary to 

hematochezia, thrombocytopenia.


Discharge Planning


Plan for EGD/colonoscopy tomorrow. Further disposition to follow.





Problem Qualifiers





(1) Syncope:  


Qualified Codes:  R55 - Syncope and collapse








Marilyn Macias PA-C May 6, 2018 9:14 am

## 2018-05-07 VITALS
RESPIRATION RATE: 20 BRPM | DIASTOLIC BLOOD PRESSURE: 91 MMHG | SYSTOLIC BLOOD PRESSURE: 127 MMHG | OXYGEN SATURATION: 95 % | TEMPERATURE: 98.4 F | HEART RATE: 62 BPM

## 2018-05-07 VITALS
OXYGEN SATURATION: 96 % | DIASTOLIC BLOOD PRESSURE: 72 MMHG | TEMPERATURE: 98.7 F | SYSTOLIC BLOOD PRESSURE: 108 MMHG | RESPIRATION RATE: 16 BRPM | HEART RATE: 65 BPM

## 2018-05-07 VITALS
RESPIRATION RATE: 16 BRPM | OXYGEN SATURATION: 92 % | DIASTOLIC BLOOD PRESSURE: 77 MMHG | HEART RATE: 63 BPM | SYSTOLIC BLOOD PRESSURE: 120 MMHG | TEMPERATURE: 97.8 F

## 2018-05-07 VITALS
OXYGEN SATURATION: 95 % | RESPIRATION RATE: 20 BRPM | TEMPERATURE: 97.9 F | DIASTOLIC BLOOD PRESSURE: 83 MMHG | SYSTOLIC BLOOD PRESSURE: 123 MMHG | HEART RATE: 58 BPM

## 2018-05-07 VITALS — HEART RATE: 69 BPM

## 2018-05-07 VITALS
DIASTOLIC BLOOD PRESSURE: 79 MMHG | TEMPERATURE: 97.4 F | OXYGEN SATURATION: 93 % | HEART RATE: 57 BPM | SYSTOLIC BLOOD PRESSURE: 121 MMHG | RESPIRATION RATE: 16 BRPM

## 2018-05-07 LAB
ALBUMIN SERPL-MCNC: 3.1 GM/DL (ref 3.4–5)
ALP SERPL-CCNC: 129 U/L (ref 45–117)
ALT SERPL-CCNC: 32 U/L (ref 12–78)
AST SERPL-CCNC: 39 U/L (ref 15–37)
BASOPHILS # BLD AUTO: 0.1 TH/MM3 (ref 0–0.2)
BASOPHILS NFR BLD: 0.9 % (ref 0–2)
BILIRUB SERPL-MCNC: 0.7 MG/DL (ref 0.2–1)
BUN SERPL-MCNC: 9 MG/DL (ref 7–18)
CALCIUM SERPL-MCNC: 9 MG/DL (ref 8.5–10.1)
CHLORIDE SERPL-SCNC: 110 MEQ/L (ref 98–107)
CREAT SERPL-MCNC: 1.11 MG/DL (ref 0.6–1.3)
EOSINOPHIL # BLD: 0.4 TH/MM3 (ref 0–0.4)
EOSINOPHIL NFR BLD: 3.8 % (ref 0–4)
ERYTHROCYTE [DISTWIDTH] IN BLOOD BY AUTOMATED COUNT: 13.5 % (ref 11.6–17.2)
GFR SERPLBLD BASED ON 1.73 SQ M-ARVRAT: 70 ML/MIN (ref 89–?)
GLUCOSE SERPL-MCNC: 89 MG/DL (ref 74–106)
HCO3 BLD-SCNC: 25.2 MEQ/L (ref 21–32)
HCT VFR BLD CALC: 50.7 % (ref 39–51)
HGB BLD-MCNC: 17.5 GM/DL (ref 13–17)
LYMPHOCYTES # BLD AUTO: 2.5 TH/MM3 (ref 1–4.8)
LYMPHOCYTES NFR BLD AUTO: 24.9 % (ref 9–44)
MCH RBC QN AUTO: 30.9 PG (ref 27–34)
MCHC RBC AUTO-ENTMCNC: 34.6 % (ref 32–36)
MCV RBC AUTO: 89.2 FL (ref 80–100)
MONOCYTE #: 1.1 TH/MM3 (ref 0–0.9)
MONOCYTES NFR BLD: 10.5 % (ref 0–8)
NEUTROPHILS # BLD AUTO: 6.1 TH/MM3 (ref 1.8–7.7)
NEUTROPHILS NFR BLD AUTO: 59.9 % (ref 16–70)
PLATELET # BLD: 33 TH/MM3 (ref 150–450)
PMV BLD AUTO: 10.4 FL (ref 7–11)
PROT SERPL-MCNC: 6.6 GM/DL (ref 6.4–8.2)
RBC # BLD AUTO: 5.68 MIL/MM3 (ref 4.5–5.9)
SODIUM SERPL-SCNC: 141 MEQ/L (ref 136–145)
WBC # BLD AUTO: 10.2 TH/MM3 (ref 4–11)

## 2018-05-07 RX ADMIN — NICOTINE SCH PATCH: 21 PATCH, EXTENDED RELEASE TOPICAL at 11:25

## 2018-05-07 RX ADMIN — HYDROCODONE BITARTRATE AND ACETAMINOPHEN PRN TAB: 10; 325 TABLET ORAL at 08:49

## 2018-05-07 RX ADMIN — HYDROCODONE BITARTRATE AND ACETAMINOPHEN PRN TAB: 10; 325 TABLET ORAL at 16:48

## 2018-05-07 RX ADMIN — ABACAVIR SULFATE SCH MG: 300 TABLET, FILM COATED ORAL at 11:21

## 2018-05-07 RX ADMIN — HYDROCODONE BITARTRATE AND ACETAMINOPHEN PRN TAB: 10; 325 TABLET ORAL at 01:51

## 2018-05-07 RX ADMIN — SODIUM CHLORIDE AND POTASSIUM CHLORIDE SCH MLS/HR: 9; 1.49 INJECTION, SOLUTION INTRAVENOUS at 03:57

## 2018-05-07 RX ADMIN — BUDESONIDE AND FORMOTEROL FUMARATE DIHYDRATE SCH PUFF: 160; 4.5 AEROSOL RESPIRATORY (INHALATION) at 11:20

## 2018-05-07 RX ADMIN — DOLUTEGRAVIR SODIUM SCH MG: 50 TABLET, FILM COATED ORAL at 11:22

## 2018-05-07 RX ADMIN — STANDARDIZED SENNA CONCENTRATE AND DOCUSATE SODIUM SCH TAB: 8.6; 5 TABLET, FILM COATED ORAL at 09:00

## 2018-05-07 NOTE — HHI.DS
__________________________________________________





Discharge Summary


Admission Date


May 4, 2018 at 15:23


Discharge Date:  May 7, 2018


Admitting Diagnosis





Syncope, hematochezia, ITP





(1) Abdominal pain


ICD Code:  R10.9 - Unspecified abdominal pain


Diagnosis:  Principal





(2) Acute kidney injury


ICD Code:  N17.9 - Acute kidney failure, unspecified


Diagnosis:  Secondary





(3) Hematochezia


ICD Code:  K92.1 - Hematochezia


Diagnosis:  Principal


Status:  Acute


(4) Chronic ITP (idiopathic thrombocytopenia)


ICD Code:  D69.3 - Immune thrombocytopenic purpura


Diagnosis:  Secondary


Status:  Acute


(5) Syncope


ICD Code:  R55 - Syncope and collapse


Diagnosis:  Secondary


Status:  Acute


(6) Hepatitis C


ICD Code:  B19.20 - Hepatitis C


Diagnosis:  Secondary


Status:  Chronic


(7) HIV (human immunodeficiency virus infection)


ICD Code:  Z21 - HIV (human immunodeficiency virus infection)


Diagnosis:  Secondary


Status:  Chronic


(8) Tobacco use


ICD Code:  Z72.0 - Tobacco use


Diagnosis:  Secondary


Status:  Chronic


Procedures


5/7/18 -  EGD/colonoscopy showed LA class a esophagitis, gastritis, internal 

and external hemorrhoids


Brief History - From Admission


The patient is a 48 year-old male who presented to the emergency department 

following a syncopal episode. He states that he has "blacked out" about 5 times 

in the past 3 days. He denies hitting his head.  He states that the episodes 

occur when he stands up to walk.  He feels lightheaded and then passes out.  He 

states that he has not been eating well over the past week or so.  He has had 

no appetite.  He has had some nausea, but no vomiting.  He denies diarrhea or 

constipation, but has had some bright red blood in his stool the last couple 

days.  He has a history of hepatitis C and HIV, as well as chronic 

thrombocytopenia.  He reports a weight loss of 10 pounds in the last week.  He 

has diffuse abdominal pain, worse in the upper abdomen.


CBC/BMP:  


5/7/18 0800                                                                    

            5/7/18 0800





Significant Findings





Laboratory Tests








Test


  5/4/18


17:41 5/5/18


01:41 5/5/18


07:12 5/5/18


12:42


 


Troponin I


  LESS THAN 0.02


NG/ML LESS THAN 0.02


NG/ML 


  


 


 


Hemoglobin


  


  


  17.4 GM/DL


(13.0-17.0) 


 


 


Hematocrit


  


  


  51.1 %


(39.0-51.0) 


 


 


Platelet Count


  


  


  30 TH/MM3


(150-450) 


 


 


Monocytes (%) (Auto)


  


  


  16.6 %


(0.0-8.0) 


 


 


Eosinophils (%) (Auto)


  


  


  5.5 %


(0.0-4.0) 


 


 


Monocytes # (Auto)


  


  


  1.4 TH/MM3


(0-0.9) 


 


 


Eosinophils # (Auto)


  


  


  0.5 TH/MM3


(0-0.4) 


 


 


Total Protein


  


  


  5.8 GM/DL


(6.4-8.2) 


 


 


Albumin


  


  


  2.6 GM/DL


(3.4-5.0) 


 


 


Calcium Level


  


  


  7.7 MG/DL


(8.5-10.1) 


 


 


Alkaline Phosphatase


  


  


  121 U/L


() 


 


 


Aspartate Amino Transf


(AST/SGOT) 


  


  45 U/L (15-37) 


  


 


 


Chloride Level


  


  


  112 MEQ/L


() 


 


 


Estimat Glomerular Filtration


Rate 


  


  79 ML/MIN


(>89) 


 


 


Hepatitis C IgG Antibody


  


  


  


  REACTIVE


(NONREACTIVE)


 


Test


  5/6/18


12:30 5/7/18


08:00 


  


 


 


Hemoglobin


  17.6 GM/DL


(13.0-17.0) 17.5 GM/DL


(13.0-17.0) 


  


 


 


Hematocrit


  52.0 %


(39.0-51.0) 


  


  


 


 


Platelet Count


  38 TH/MM3


(150-450) 33 TH/MM3


(150-450) 


  


 


 


Monocytes (%) (Auto)


  12.4 %


(0.0-8.0) 10.5 %


(0.0-8.0) 


  


 


 


Eosinophils (%) (Auto)


  4.5 %


(0.0-4.0) 


  


  


 


 


Monocytes # (Auto)


  1.2 TH/MM3


(0-0.9) 1.1 TH/MM3


(0-0.9) 


  


 


 


Albumin


  2.9 GM/DL


(3.4-5.0) 3.1 GM/DL


(3.4-5.0) 


  


 


 


Calcium Level


  8.4 MG/DL


(8.5-10.1) 


  


  


 


 


Alkaline Phosphatase


  128 U/L


() 129 U/L


() 


  


 


 


Aspartate Amino Transf


(AST/SGOT) 41 U/L (15-37) 


  39 U/L (15-37) 


  


  


 


 


Chloride Level


  110 MEQ/L


() 110 MEQ/L


() 


  


 


 


Estimat Glomerular Filtration


Rate 69 ML/MIN


(>89) 70 ML/MIN


(>89) 


  


 


 


Platelet Estimate  LOW (NORMAL)   


 


Platelet Morphology Comment


  


  ENLARGED


(NORMAL) 


  


 








Imaging





Last Impressions








Abdomen/Pelvis CT 5/4/18 1254 Signed





Impressions: 





 Service Date/Time:  Friday, May 4, 2018 13:59 - CONCLUSION:  1. No acute 

finding 





 is identified on this noncontrast examination to explain the clinical 

symptoms. 





 2. Mild atherosclerotic disease.     Hua Elliott MD 


 


Chest X-Ray 5/4/18 1222 Signed





Impressions: 





 Service Date/Time:  Friday, May 4, 2018 12:37 - CONCLUSION:  Stable appearance 





 with no acute disease.       Saul Ulloa MD 


 


Head CT 5/4/18 0000 Signed





Impressions: 





 Service Date/Time:  Friday, May 4, 2018 13:57 - CONCLUSION:  1. No acute 





 intracranial abnormality identified.     Mateo Yeung MD 








PE at Discharge


GENERAL: Well-nourished, well-developed middle aged male patient in [facility].


SKIN: Warm and dry. No rash.


HEENT:  Normocephalic. Atraumatic.Pupils equal and round. Mucous membranes pink 

and moist.


CARDIOVASCULAR: Regular rate and rhythm. No murmur appreciated. 


RESPIRATORY: No accessory muscle use. Clear to auscultation. Breath sounds 

equal bilaterally.  


GASTROINTESTINAL: Abdomen soft, nondistended, minimal upper abdominal tenderness

, improved.  Normoactive bowel sounds x4.


MUSCULOSKELETAL: No obvious deformities. Extremities without clubbing, cyanosis

, or edema. 


NEUROLOGICAL: Awake and alert. No obvious cranial nerve deficits.  Motor 

grossly within normal limits.  Normal speech.


PSYCHIATRIC: Appropriate mood and affect; insight and judgment normal.


Hospital Course


48 year-old male with history of HIV, Hepatitis C, Chronic thrombocytopenia, s/

p splenectomy, COPD, arthritis, anxiety, presents for syncope and hematochezia 

  





Syncopal episode: Suspect secondary to dehydration however rule out other 

etiologies such as ACS, cardiomyopathy, arrhythmia. Head CT images reviewed, no 

acute findings. ACS ruled out with negative serial cardiac enzymes x3 and EKG 

without acute ST changes. Monitor on telemetry, no arrhythmias. Echocardiogram 

wnl with EF 60-65%. Given IVF hydration. No further lightheadedness/dizziness 

or near syncopal episodes throughout admission. 





Abdominal Pain/Hematochezia/Weight Loss: Patient reports bright red blood with 

bowel movements.  Rectal exam in the ED revealed external hemorrhoids however 

hemoccult negative. Abd/pelvis CT reviewed, no acute findings. Monitored CBC, 

currently stable with Hgb 18.3 --> 17.5.  Consult GI, performed EGD/colonoscopy 

5/7 which showed esophagitis, gastritis, internal and external hemorrhoids. 

Started on Protonix 40mg daily. Recommended high fiber diet. Cleared for 

discharge by GI. 





Acute kidney injury: Patient appears to be dehydrated.  He has had poor oral 

intake over the past week with decreased appetite, nausea, abdominal pain, and 

weight loss.  Given IV fluids. Avoid nephrotoxins. Creatinine improved 1.36 --> 

1.00. Resolved. 





Thrombocytopenia: Patient reports chronic ITP.  Labs with persistent 

thrombocytopenia Plt 30K. No recurrent bleeding episodes. Consulted hematology, 

appreciate recommendations, does not need treatment at this time.





HIV: Chronic. Continue home medications.





Hepatitis C with Cirrhosis: treatment naive. Hepatitis profile +Hepatitis C. GI 

consulted, outpatient f/up. 





Polysubstance abuse: He admits to marijuana and cocaine use.   Patient was 

counseled.





Tobacco abuse: chronic. Patient was counseled regarding smoking cessation.


Pt Condition on Discharge:  Stable


Discharge Disposition:  Discharge Home


Discharge Time:  <= 30 minutes


Discharge Instructions


DIET: Follow Instructions for:  As Tolerated, No Restrictions, High Fiber Diet


Activities you can perform:  Regular-No Restrictions


Follow up Referrals:  


Gastroenterology - 1 Week @ [facility]


Oncology/Hematology - 1 Week with Chi Johnson MD


PCP Follow-up - 1 Week





New Medications:  


Pantoprazole (Pantoprazole) 40 Mg Tab


40 MG PO DAILY for gastritis, #30 TAB 1 Refill





 


Continued Medications:  


Abacavir-Dolutegravir-Lamivudine (Triumeq) 600- Mg Tab


1 TAB PO DAILY for Mgmt Viral Infection, #30 TAB 0 Refills


Hazardous agent; use appropriate precautions for handling & disposal.


Albuterol 8.5 GM Inh (Proair Hfa 8.5 GM Inh) 90 Mcg/Act Aer


1 PUFF INH Q4H PRN for SHORTNESS OF BREATH, #1 INHALER 0 Refills


108 mcg/actuation


Fluticasone-Salmeterol Inh (Advair Diskus Inh) 250-50 Mcg/Blist Aer


2 PUFF INH BID, #1 INHALER 0 Refills


Rinse mouth after use.


Hydrocodone-Acetaminophen (Norco)  Mg Tab


1 TAB PO Q4H PRN for PAIN, TAB 0 Refills





 


Discontinued Medications:  


Cyclobenzaprine (Flexeril) 5 Mg Tab


5 MG PO TID for Muscle Spasm, #90 TAB 0 Refills





Diazepam (Valium) 10 Mg Tab


10 MG PO TID PRN for ANXIETY, TAB 0 Refills

















Marilyn Macias PA-C May 7, 2018 15:41

## 2018-05-07 NOTE — ECHRPT
Indication:   syncope

 

 CONCLUSIONS

 The left ventricular systolic function is normal with an estimated ejection fraction in the range of
 60-65%. 

 Normal left ventricular size. 

 Wall thickness is normal. 

 No regional wall motion abnormalities are present. 

 There is trace tricuspid valve regurgitation. 

 The estimated pulmonary arterial pressure is 34 mmHg. 

 

 BP:  123   / 85      HR: 58                       Rhythm:           Sinus

 

 MEASUREMENTS  (Male / Female) Normal Values       Technical Quality:Good

 2D ECHO

 LV Diastolic Diameter PLAX        4.0 cm                4.2 - 5.9 / 3.9 - 5.3 cm

 LV Systolic Diameter PLAX         2.6 cm                

 IVS Diastolic Thickness           0.9 cm                0.6 - 1.0 / 0.6 - 0.9 cm

 LVPW Diastolic Thickness          0.9 cm                0.6 - 1.0 / 0.6 - 0.9 cm

 LV Relative Wall Thickness        0.4                   

 RV Internal Dim ED PLAX           3.7 cm                

 LVOT Diameter                     1.7 cm                

 LA Systolic Diameter LX           3.3 cm                3.0 - 4.0 / 2.7 - 3.8 cm

 LV Ejection Fraction MOD 4C       65.1 %                

 LV Cardiac Index MOD 4C           1680.3 cm/minm     

 LV Ejection Fraction 4C AL        65.2 %                

 LV Cardiac Index 4C AL            1726.6 cm/minm     

 

 M-MODE

 Aortic Root Diameter MM           2.1 cm                

 LA Systolic Diameter MM           3.4 cm                

 LA Ao Ratio MM                    1.6                   

 AV Cusp Separation MM             1.9 cm                

 

 DOPPLER

 AV Peak Velocity                  137.0 cm/s            

 AV Peak Gradient                  7.5 mmHg              

 LVOT Peak Velocity                89.3 cm/s             

 LVOT Peak Gradient                3.2 mmHg              

 AV Area Cont Eq pk                1.5 cm               

 MV Area PHT                       4.4 cm               

 Mitral E Point Velocity           73.5 cm/s             

 Mitral A Point Velocity           54.3 cm/s             

 Mitral E to A Ratio               1.4                   

 TR Peak Velocity                  247.0 cm/s            

 TR Peak Gradient                  24.4 mmHg             

 Right Atrial Pressure             10.0 mmHg             

 Pulmonary Artery Systolic Pressu  34.4 mmHg             

 Right Ventricular Systolic Press  34.4 mmHg             

 PV Peak Velocity                  109.0 cm/s            

 PV Peak Gradient                  4.8 mmHg              

 

 

 FINDINGS

 

 LEFT VENTRICLE

 The left ventricular systolic function is normal with an estimated ejection fraction in the range of
 60-65%. 

 Normal left ventricular size. 

 Wall thickness is normal. 

 No regional wall motion abnormalities are present. 

 

 RIGHT VENTRICLE

 Normal right ventricular size and systolic function.  

 

 LEFT ATRIUM

 The left atrial size is normal.  

 

 RIGHT ATRIUM

 The right atrial size is normal.  

 

 ATRIAL SEPTUM

 Normal atrial septal thickness without atrial level shunting by limited color doppler interrogation.
  

 

 AORTA

 The aortic root and proximal ascending aorta are normal in size on limited imaging.  

 

 MITRAL VALVE

 Structurally normal mitral valve. No mitral valve stenosis or regurgitation.  

 

 AORTIC VALVE

 Trileaflet aortic valve. No aortic valve stenosis or regurgitation.  

 

 TRICUSPID VALVE

 Structurally normal tricuspid valve. 

 There is trace tricuspid valve regurgitation. 

 The estimated pulmonary arterial pressure is 34.4 mmHg. 

 

 PULMONARY VALVE

 No pulmonary valve regurgitation or stenosis.  

 

 VESSELS

 The inferior vena cava is normal in size.  

 

 PERICARDIUM

 No pericardial effusion.  

 

 

 

 

  Dolores Hemphill MD, FACC

  (Electronically Signed)

  Final Date:07 May 2018 19:29

## 2018-05-07 NOTE — HHI.DCPOC
Discharge Care Plan


Diagnosis:  


(1) Syncope


(2) Rectal bleed


(3) Abdominal pain


(4) External hemorrhoid


(5) Internal hemorrhoids


(6) Gastritis


(7) Esophagitis


Goals to Promote Your Health


* To prevent worsening of your condition and complications


* To maintain your health at the optimal level


Directions to Meet Your Goals


*** Take your medications as prescribed


*** Follow your dietary instruction


*** Follow activity as directed








*** Keep your appointments as scheduled


*** Take your immunizations and boosters as scheduled


*** If your symptoms worsen call your PCP, if no PCP go to Urgent Care Center 

or Emergency Room***


*** Smoking is Dangerous to Your Health. Avoid second hand smoke***


***Call the 24-hour hour crisis hotline for domestic abuse at 1-391.333.6659***











Marilyn Macias PA-C May 7, 2018 14:22

## 2018-05-07 NOTE — HHI.PR
Subjective


Remarks


Follow up for syncope, hematochezia, thrombocytopenia. The patient reports some 

mild diffuse upper abdominal pain, unchanged compared to yesterday. Denies 

nausea/vomiting. He reports multiple nonbloody BMs overnight after bowel prep. 

Denies fevers/chills. Denies any lightheadedness/dizziness. He has no other 

medical complaints at this time.





Objective


Vitals





Vital Signs








  Date Time  Temp Pulse Resp B/P (MAP) Pulse Ox O2 Delivery O2 Flow Rate FiO2


 


5/7/18 06:25 97.8 63 16 120/77 (91) 92   


 


5/7/18 01:27 98.7 65 16 108/72 (84) 96   


 


5/6/18 16:42 97.9 60 20 115/75 (88) 96   





    120/84 (96)    





    122/90 (101)    


 


5/6/18 13:26  59      


 


5/6/18 12:41 98.2 78 20 128/68 (88) 98   








Result Diagram:  


5/6/18 1230                                                                    

            5/6/18 1230





Imaging





Last Impressions








Abdomen/Pelvis CT 5/4/18 1254 Signed





Impressions: 





 Service Date/Time:  Friday, May 4, 2018 13:59 - CONCLUSION:  1. No acute 

finding 





 is identified on this noncontrast examination to explain the clinical 

symptoms. 





 2. Mild atherosclerotic disease.     Hua Elliott MD 


 


Chest X-Ray 5/4/18 1222 Signed





Impressions: 





 Service Date/Time:  Friday, May 4, 2018 12:37 - CONCLUSION:  Stable appearance 





 with no acute disease.       Saul Ulloa MD 


 


Head CT 5/4/18 0000 Signed





Impressions: 





 Service Date/Time:  Friday, May 4, 2018 13:57 - CONCLUSION:  1. No acute 





 intracranial abnormality identified.     Mateo Yeung MD 








Objective Remarks


GENERAL: Well-nourished, well-developed middle aged male patient in [facility].


SKIN: Warm and dry. No rash.


HEENT:  Normocephalic. Atraumatic.Pupils equal and round. Mucous membranes pink 

and moist.


CARDIOVASCULAR: Regular rate and rhythm. No murmur appreciated. 


RESPIRATORY: No accessory muscle use. Clear to auscultation. Breath sounds 

equal bilaterally.  


GASTROINTESTINAL: Abdomen soft, nondistended, minimal upper abdominal tenderness

, improved.  Normoactive bowel sounds x4.


MUSCULOSKELETAL: No obvious deformities. Extremities without clubbing, cyanosis

, or edema. 


NEUROLOGICAL: Awake and alert. No obvious cranial nerve deficits.  Motor 

grossly within normal limits.  Normal speech.


PSYCHIATRIC: Appropriate mood and affect; insight and judgment normal.


Medications and IVs





Current Medications








 Medications


  (Trade)  Dose


 Ordered  Sig/[place]


 Route  Start Time


 Stop Time Status Last Admin


 


  (NS Flush)  2 ml  UNSCH  PRN


 IVF  5/4/18 12:30


     


 


 


  (NS Flush)  2 ml  UNSCH  PRN


 IVF  5/4/18 12:30


     


 


 


  (Zofran Inj)  4 mg  Q6H  PRN


 IVP  5/4/18 16:15


     


 


 


  (Narcan Inj)  0.4 mg  UNSCH  PRN


 IV PUSH  5/4/18 16:15


     


 


 


  (Vivien-Colace)  1 tab  BID


 PO  5/4/18 21:00


    5/6/18 21:48


 


 


  (Milk Of


 Magnesia Liq)  30 ml  Q12H  PRN


 PO  5/4/18 16:15


     


 


 


  (Senokot)  17.2 mg  Q12H  PRN


 PO  5/4/18 16:15


     


 


 


  (Dulcolax Supp)  10 mg  DAILY  PRN


 RECTAL  5/4/18 16:15


     


 


 


  (Lactulose Liq)  30 ml  DAILY  PRN


 PO  5/4/18 16:15


     


 


 


 Potassium


 Chloride/Sodium


 Chloride  1,000 ml @ 


 84 mls/hr  [id number]


 IV  5/4/18 16:15


    5/6/18 16:19


 


 


  (Epivir)  300 mg  DAILY


 PO  5/5/18 09:00


    5/6/18 08:15


 


 


  (Ziagen)  600 mg  DAILY


 PO  5/5/18 09:00


    5/6/18 08:15


 


 


  (Norco  5-325 Mg)  1 tab  Q4H  PRN


 PO  5/5/18 13:45


    5/6/18 08:16


 


 


  (Norco  Mg)  1 tab  Q4H  PRN


 PO  5/5/18 13:45


    5/7/18 08:49


 


 


  (Proair Hfa Inh)  1 puff  Q4H  PRN


 INH  5/5/18 13:45


     


 


 


  (Habitrol 21 Mg


 Patch.24 Hr)  1 patch  DAILY


 T-DERMAL  5/5/18 14:30


    5/6/18 08:16


 


 


 Miscellaneous


 Information  1  DAILY


 T-DERMAL  5/5/18 14:30


    5/6/18 08:16


 


 


  (Symbicort


 160-4.5 Mcg Inh)  2 puff  BID


 INH  5/5/18 21:00


    5/6/18 21:48


 


 


 Lactated Ringer's  1,000 ml @ 


 30 mls/hr  Q24H PRN


 IV  5/6/18 11:30


 5/9/18 11:29   


 


 


 Sodium Chloride  500 ml @ 


 30 mls/hr  [id number] PRN


 IV  5/6/18 11:30


 5/9/18 11:29   


 


 


  (Lopressor)  25 mg  ON CALL  PRN


 PO  5/6/18 11:30


 5/9/18 11:29   


 


 


  (Betadine 5%


 Antisepsis Kit)  1 applic  ON CALL  PRN


 EACH NARE  5/6/18 11:30


 5/9/18 11:29   


 


 


  (Chlorhexidine


 2% Cloth)  3 pack  ON CALL  PRN


 TOPICAL  5/6/18 11:30


 5/9/18 11:29   


 











A/P


Problem List:  


(1) Acute kidney injury


ICD Code:  N17.9 - Acute kidney failure, unspecified


(2) Abdominal pain


ICD Code:  R10.9 - Unspecified abdominal pain


(3) Hematochezia


ICD Code:  K92.1 - Hematochezia


Status:  Acute


(4) Chronic ITP (idiopathic thrombocytopenia)


ICD Code:  D69.3 - Immune thrombocytopenic purpura


Status:  Acute


(5) Syncope


ICD Code:  R55 - Syncope and collapse


Status:  Acute


(6) Hepatitis C


ICD Code:  B19.20 - Hepatitis C


Status:  Chronic


(7) HIV (human immunodeficiency virus infection)


ICD Code:  Z21 - HIV (human immunodeficiency virus infection)


Status:  Chronic


(8) Tobacco use


ICD Code:  Z72.0 - Tobacco use


Status:  Chronic


Assessment and Plan


48 year-old male with history of HIV, Hepatitis C, Chronic thrombocytopenia, s/

p splenectomy, COPD, arthritis, anxiety, presents for syncope and hematochezia 

  





Syncopal episode: Suspect secondary to dehydration however rule out other 

etiologies such as ACS, cardiomyopathy, arrhythmia


   -Head CT images reviewed, now acute findings


   -ACS ruled out with negative serial cardiac enzymes x3 and EKG without acute 

ST changes


   -Monitor on telemetry


   -Check echocardiogram


   -Give IVF hydration





Abdominal Pain/Hematochezia/Weight Loss: Patient reports bright red blood with 

bowel movements.  Rectal exam in the ED revealed external hemorrhoids however 

hemoccult negative. 


   -Abd/pelvis CT reviewed, no acute findings


   -Monitor CBC, currently stable with Hgb 18.3 --> 17.5


   -check stool hemoccult 


   -Consult GI, appreciate recommendations, plan for EGD/colonoscopy today





Acute kidney injury: Patient appears to be dehydrated.  He has had poor oral 

intake over the past week with decreased appetite, nausea, abdominal pain, and 

weight loss.  


   -Continue IV fluids. 


   -Avoid nephrotoxins


   -Creatinine improved 1.36 --> 1.00


   -Continue to monitor





Thrombocytopenia: Patient reports chronic ITP.  


   -Monitor labs. 


   -Monitor for worsening bleeding episodes.


   -Consulted hematology, appreciate recommendations, does not need treatment 

at this time





HIV: Chronic


   -continue home medications.





Hepatitis C with Cirrhosis: treatment naive


   -hepatitis profile ordered


   -GI consulted, appreciate recommendations





Polysubstance abuse: He admits to marijuana and cocaine use.   


   -Patient was counseled.





Tobacco abuse: chronic


   -Patient was counseled regarding smoking cessation.





DVT prophylaxis: SCDs, ULICES hose.  Avoid chemical prophylaxis secondary to 

hematochezia, thrombocytopenia.


Discharge Planning


Awaiting EGD. Plan for EGD/colonoscopy today. Further disposition to follow.





Problem Qualifiers





(1) Syncope:  


Qualified Codes:  R55 - Syncope and collapse








Marilyn Macias PA-C May 7, 2018 8:26 am

## 2018-05-07 NOTE — GIPROC
[facility]

[address] [facility]. [facility], [address]

 

 

COLONOSCOPY PROCEDURE REPORT     EXAM DATE: 05/07/2018

 

PATIENT NAME:      Enrique Pena           MR #:      [id number]

YOB: 1970      VISIT #:     [id number]

ENDOSCOPIST:     Oanh Calvert MD     ORDER #:     [id number]

ASSISTANT:      Roni Leiva and Giselle Cabrera     STATUS:     inpatient

 

INDICATIONS:  The patient is a 48 yr old male here for a colonoscopy due to

abdominal pain

PROCEDURE PERFORMED:     Colonoscopy, diagnostic

MEDICATIONS:     None and Per Anesthesia.

PREP QUALITY:     The [place] Bowel Prep Score was Right colon 2, Mid colon 2,

and Left colon 2.  Total = 6. PREP TYPE:GoLytely

ESTIMATED BLOOD LOSS:     None

 

CONSENT: The patient understands the risks and benefits of the procedure and

understands that these risks include, but are not limited to: sedation,

allergic reaction, infection, perforation and/or bleeding. Alternative means of

evaluation and treatment include, among others: physical exam, x-rays, and/or

surgical intervention. The patient elects to proceed with this endoscopic

procedure.

 



medical equipment was checked for proper function. Hand hygiene and appropriate

measures for infection prevention was taken. After the risks, benefits and

alternatives of the procedure were thoroughly explained, Informed consent was

verified, confirmed and timeout was successfully executed by the treatment

team. A digital exam revealed external hemorrhoids The Pentax EC-[id number]

endoscope was introduced through the anus and advanced to the cecum, which was

identified by both the appendix and ileocecal valve. The instrument was then

slowly withdrawn as the colon was fully examined.

 

 

COLON FINDINGS: The colonic mucosa appeared normal. Retroflexed views revealed

internal hemorrhoids and Retroflexed views revealed medium internal hemorrhoids

The scope was then completely withdrawn from the patient and the procedure

terminated.

 

 

 

 

ADVERSE EVENTS:      There were no complications.

IMPRESSIONS:     1.  The colonic mucosa appeared normal

2.  Retroflexed views revealed internal hemorrhoids

3.  Retroflexed views revealed medium internal hemorrhoids

4.  Revealed external hemorrhoids

 

RECOMMENDATIONS:     1.  Benefiber 2 tsp daily

2.  Continue surveillance

3.  High fiber diet

RECALL:     Return 5 years Colonoscopy

 

_____________________________

Oanh Calvert MD

eSigned:  Oanh Calvert MD 05/07/2018 10:32 AM

 

 

cc:

 

 

 

 

PATIENT NAME:  Enrique Pena

MR#: [id number]

## 2018-05-07 NOTE — GIPROC
[facility]

[address] [facility]. [facility], [address]

 

 

EGD PROCEDURE REPORT     EXAM DATE: 05/07/2018

 

PATIENT NAME:      Enrique Pena           MR #:      [id number]

YOB: 1970      VISIT #:     [id number]

ATTENDING:     Oanh Calvert MD     ORDER #:     [id number]

ASSISTANT:      Roni Leiva and Giselle Cabrera     STATUS:     inpatient

 

INDICATIONS:  The patient is a 48 yr old male here for an EGD due to epigastric

abdominal pain

PROCEDURE PERFORMED:     EGD w/ biopsy

MEDICATIONS:     None and Per Anesthesia.

TOPICAL ANESTHETIC:

 

CONSENT: The patient understands the risks and benefits of the procedure and

understands that these risks include, but are not limited to: sedation,

allergic reaction, infection, perforation and/or bleeding. Alternative means of

evaluation and treatment include, among others: physical exam, x-rays, and/or

surgical intervention. The patient elects to proceed with this endoscopic

procedure.

 



medical equipment was checked for proper function. Hand hygiene and appropriate

measures for infection prevention was taken. After the risks, benefits and

alternatives of the procedure were thoroughly explained, Informed consent was

verified, confirmed and timeout was successfully executed by the treatment

team. The patient was anesthetized with topical anesthesia and the EC-[id number]

(Pedi C) endoscope was introduced through the mouth and advanced to the second

portion of the duodenum.  Retroflexed views revealed no abnormalities  The

gastroscope was then slowly withdrawn and removed.

 

ESOPHAGUS: There was LA Class A esophagitis noted.  A biopsy was performed using

cold forceps.  Sample sent for histology.

 

STOMACH: There was moderate and erosive gastritis in the gastric antrum.  A

biopsy was performed using cold forceps.  Sample sent for histology.

 

DUODENUM: The duodenal mucosa appeared normal in the bulb and second portion of

the duodenum.

 

 

 

ADVERSE EVENTS:     There were no complications.

IMPRESSIONS:     1.  There was LA Class A esophagitis noted; biopsy was

performed

2.  There was gastritis in the gastric antrum; biopsy was performed

3.  Normal duodenal mucosa in the bulb and second portion of the duodenum

4.  Retroflexed views revealed no abnormalities

 

RECOMMENDATIONS:     1.  Await biopsy results.  Biopsy results will not be ready

for 7-10 days.  If you don't hear from us in two weeks, call our office for

biopsy results.

2.  Anti-reflux regimen

3.  Continue PPI

4.  Avoid NSAIDS

PATIENT CONDITION:     stable

DISPOSITION:     Inpatient

REPEAT EXAM:     Return 1 year EGD pending biopsy results

 

 

___________________________________

Oanh Calvert MD

eSigned:  Oanh Calvert MD 05/07/2018 10:23 AM

 

 

cc:

 

 

 

 

PATIENT NAME:  Enrique Pena

MR#: [id number]

## 2018-05-09 LAB — HCV RNA SERPL NAA+PROBE-ACNC: (no result) IU/ML

## 2018-06-10 ENCOUNTER — HOSPITAL ENCOUNTER (INPATIENT)
Dept: HOSPITAL 17 - NEPC | Age: 48
LOS: 3 days | Discharge: HOME | DRG: 882 | End: 2018-06-13
Attending: PSYCHIATRY & NEUROLOGY | Admitting: PSYCHIATRY & NEUROLOGY
Payer: MEDICAID

## 2018-06-10 VITALS
DIASTOLIC BLOOD PRESSURE: 66 MMHG | HEART RATE: 66 BPM | SYSTOLIC BLOOD PRESSURE: 116 MMHG | OXYGEN SATURATION: 99 % | RESPIRATION RATE: 18 BRPM

## 2018-06-10 VITALS
TEMPERATURE: 97 F | DIASTOLIC BLOOD PRESSURE: 58 MMHG | RESPIRATION RATE: 15 BRPM | OXYGEN SATURATION: 98 % | HEART RATE: 74 BPM | SYSTOLIC BLOOD PRESSURE: 117 MMHG

## 2018-06-10 VITALS — WEIGHT: 147.71 LBS | HEIGHT: 70 IN | BODY MASS INDEX: 21.15 KG/M2

## 2018-06-10 VITALS
TEMPERATURE: 97.4 F | RESPIRATION RATE: 10 BRPM | OXYGEN SATURATION: 97 % | DIASTOLIC BLOOD PRESSURE: 80 MMHG | HEART RATE: 87 BPM | SYSTOLIC BLOOD PRESSURE: 127 MMHG

## 2018-06-10 VITALS
HEART RATE: 71 BPM | OXYGEN SATURATION: 98 % | RESPIRATION RATE: 18 BRPM | SYSTOLIC BLOOD PRESSURE: 110 MMHG | DIASTOLIC BLOOD PRESSURE: 73 MMHG

## 2018-06-10 VITALS
HEART RATE: 89 BPM | SYSTOLIC BLOOD PRESSURE: 132 MMHG | TEMPERATURE: 97.7 F | DIASTOLIC BLOOD PRESSURE: 79 MMHG | RESPIRATION RATE: 16 BRPM | OXYGEN SATURATION: 98 %

## 2018-06-10 VITALS
SYSTOLIC BLOOD PRESSURE: 127 MMHG | DIASTOLIC BLOOD PRESSURE: 71 MMHG | RESPIRATION RATE: 18 BRPM | HEART RATE: 85 BPM | OXYGEN SATURATION: 99 %

## 2018-06-10 VITALS — OXYGEN SATURATION: 94 %

## 2018-06-10 DIAGNOSIS — N17.9: ICD-10-CM

## 2018-06-10 DIAGNOSIS — J44.9: ICD-10-CM

## 2018-06-10 DIAGNOSIS — Z21: ICD-10-CM

## 2018-06-10 DIAGNOSIS — R74.0: ICD-10-CM

## 2018-06-10 DIAGNOSIS — D69.3: ICD-10-CM

## 2018-06-10 DIAGNOSIS — B18.2: ICD-10-CM

## 2018-06-10 DIAGNOSIS — D72.829: ICD-10-CM

## 2018-06-10 DIAGNOSIS — M51.26: ICD-10-CM

## 2018-06-10 DIAGNOSIS — K29.70: ICD-10-CM

## 2018-06-10 DIAGNOSIS — E87.2: ICD-10-CM

## 2018-06-10 DIAGNOSIS — Z65.3: ICD-10-CM

## 2018-06-10 DIAGNOSIS — M19.90: ICD-10-CM

## 2018-06-10 DIAGNOSIS — E87.6: ICD-10-CM

## 2018-06-10 DIAGNOSIS — F43.12: ICD-10-CM

## 2018-06-10 DIAGNOSIS — G92: ICD-10-CM

## 2018-06-10 DIAGNOSIS — E86.0: ICD-10-CM

## 2018-06-10 DIAGNOSIS — F17.210: ICD-10-CM

## 2018-06-10 DIAGNOSIS — F43.25: Primary | ICD-10-CM

## 2018-06-10 DIAGNOSIS — G47.30: ICD-10-CM

## 2018-06-10 DIAGNOSIS — Z90.81: ICD-10-CM

## 2018-06-10 DIAGNOSIS — R94.31: ICD-10-CM

## 2018-06-10 DIAGNOSIS — T42.6X2A: ICD-10-CM

## 2018-06-10 DIAGNOSIS — E72.20: ICD-10-CM

## 2018-06-10 LAB
ALBUMIN SERPL-MCNC: 3.5 GM/DL (ref 3.4–5)
ALP SERPL-CCNC: 151 U/L (ref 45–117)
ALT SERPL-CCNC: 39 U/L (ref 12–78)
APAP SERPL-MCNC: (no result) MCG/ML (ref 10–30)
AST SERPL-CCNC: 48 U/L (ref 15–37)
BASOPHILS # BLD AUTO: 0.1 TH/MM3 (ref 0–0.2)
BASOPHILS NFR BLD: 0.5 % (ref 0–2)
BILIRUB SERPL-MCNC: 0.5 MG/DL (ref 0.2–1)
BUN SERPL-MCNC: 18 MG/DL (ref 7–18)
CALCIUM SERPL-MCNC: 8.6 MG/DL (ref 8.5–10.1)
CHLORIDE SERPL-SCNC: 110 MEQ/L (ref 98–107)
COLOR UR: YELLOW
CREAT SERPL-MCNC: 1.36 MG/DL (ref 0.6–1.3)
EOSINOPHIL # BLD: 0.1 TH/MM3 (ref 0–0.4)
EOSINOPHIL NFR BLD: 0.3 % (ref 0–4)
ERYTHROCYTE [DISTWIDTH] IN BLOOD BY AUTOMATED COUNT: 13.9 % (ref 11.6–17.2)
GFR SERPLBLD BASED ON 1.73 SQ M-ARVRAT: 55 ML/MIN (ref 89–?)
GLUCOSE SERPL-MCNC: 88 MG/DL (ref 74–106)
GLUCOSE UR STRIP-MCNC: (no result) MG/DL
HCO3 BLD-SCNC: 18.1 MEQ/L (ref 21–32)
HCT VFR BLD CALC: 53.5 % (ref 39–51)
HGB BLD-MCNC: 18.5 GM/DL (ref 13–17)
HGB UR QL STRIP: (no result)
INR PPP: 1.1 RATIO
KETONES UR STRIP-MCNC: (no result) MG/DL
LYMPHOCYTES # BLD AUTO: 4.1 TH/MM3 (ref 1–4.8)
LYMPHOCYTES NFR BLD AUTO: 21.7 % (ref 9–44)
MCH RBC QN AUTO: 30.6 PG (ref 27–34)
MCHC RBC AUTO-ENTMCNC: 34.5 % (ref 32–36)
MCV RBC AUTO: 88.8 FL (ref 80–100)
MONOCYTE #: 1.4 TH/MM3 (ref 0–0.9)
MONOCYTES NFR BLD: 7.2 % (ref 0–8)
MUCOUS THREADS #/AREA URNS LPF: (no result) /LPF
NEUTROPHILS # BLD AUTO: 13.1 TH/MM3 (ref 1.8–7.7)
NEUTROPHILS NFR BLD AUTO: 70.3 % (ref 16–70)
NITRITE UR QL STRIP: (no result)
PLATELET # BLD: 122 TH/MM3 (ref 150–450)
PMV BLD AUTO: 10.7 FL (ref 7–11)
PROT SERPL-MCNC: 7.2 GM/DL (ref 6.4–8.2)
PROTHROMBIN TIME: 11.4 SEC (ref 9.8–11.6)
RBC # BLD AUTO: 6.02 MIL/MM3 (ref 4.5–5.9)
SODIUM SERPL-SCNC: 143 MEQ/L (ref 136–145)
SP GR UR STRIP: 1.01 (ref 1–1.03)
SQUAMOUS #/AREA URNS HPF: <1 /HPF (ref 0–5)
URINE LEUKOCYTE ESTERASE: (no result)
WBC # BLD AUTO: 18.7 TH/MM3 (ref 4–11)

## 2018-06-10 PROCEDURE — 80061 LIPID PANEL: CPT

## 2018-06-10 PROCEDURE — 82805 BLOOD GASES W/O2 SATURATION: CPT

## 2018-06-10 PROCEDURE — 85027 COMPLETE CBC AUTOMATED: CPT

## 2018-06-10 PROCEDURE — 85730 THROMBOPLASTIN TIME PARTIAL: CPT

## 2018-06-10 PROCEDURE — 93005 ELECTROCARDIOGRAM TRACING: CPT

## 2018-06-10 PROCEDURE — 83036 HEMOGLOBIN GLYCOSYLATED A1C: CPT

## 2018-06-10 PROCEDURE — 80164 ASSAY DIPROPYLACETIC ACD TOT: CPT

## 2018-06-10 PROCEDURE — 80053 COMPREHEN METABOLIC PANEL: CPT

## 2018-06-10 PROCEDURE — 80307 DRUG TEST PRSMV CHEM ANLYZR: CPT

## 2018-06-10 PROCEDURE — 96361 HYDRATE IV INFUSION ADD-ON: CPT

## 2018-06-10 PROCEDURE — 85025 COMPLETE CBC W/AUTO DIFF WBC: CPT

## 2018-06-10 PROCEDURE — 36600 WITHDRAWAL OF ARTERIAL BLOOD: CPT

## 2018-06-10 PROCEDURE — 85610 PROTHROMBIN TIME: CPT

## 2018-06-10 PROCEDURE — 81001 URINALYSIS AUTO W/SCOPE: CPT

## 2018-06-10 PROCEDURE — 82140 ASSAY OF AMMONIA: CPT

## 2018-06-10 PROCEDURE — 96360 HYDRATION IV INFUSION INIT: CPT

## 2018-06-10 RX ADMIN — BUDESONIDE AND FORMOTEROL FUMARATE DIHYDRATE SCH PUFF: 160; 4.5 AEROSOL RESPIRATORY (INHALATION) at 21:00

## 2018-06-10 RX ADMIN — PRAZOSIN HYDROCHLORIDE SCH MG: 1 CAPSULE ORAL at 21:00
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
## 2018-06-10 NOTE — PD
HPI


Chief Complaint:  Intentional overdose


Time Seen by Provider:  03:54


Travel History


International Travel<30 days:  No


Contact w/Intl Traveler<30days:  No





History of Present Illness


HPI


49yo M with PMH of HIV CD4 count in the 700s, Hep C, presents to the ED under 

[place] Act for intentional overdose as suicidal attempt.  Pt's girlfriend was 

breaking up with him so he took #30 divalproex sodium ER 250mg at 2:30am today.

  It was his stepson's medication.  Pt is lethargic but arousable and answers 

questions.  Follows commands.  Said he has chronic abdominal pain from his Hep 

C.  Denies any chest pain, sob, n/v, focal weakness or numbness.  Denies taking 

any other medications.





PFSH


Past Medical History


Arthritis:  Yes


Asthma:  No


Autoimmune Disease:  Yes (HIV)


Blood Disorders:  Yes (thrombocytopenia)


Anxiety:  Yes


Depression:  No


Heart Rhythm Problems:  No


Cancer:  No


Cardiovascular Problems:  Yes


High Cholesterol:  No


Chemotherapy:  No


Chest Pain:  Yes


Congestive Heart Failure:  No


COPD:  Yes


Cerebrovascular Accident:  No


Diabetes:  No


Diminished Hearing:  No


Endocrine:  No


Gastrointestinal Disorders:  No


GERD:  No


Glaucoma:  No


Genitourinary:  No


Headaches:  No


Hepatitis:  Yes (C)


Hiatal Hernia:  No


Hypertension:  No


Immune Disorder:  Yes (HIV dx 1991, TCell 788)


Implanted Vascular Access Dvce:  No


Kidney Stones:  No


Musculoskeletal:  Yes (arthritis left ankle, 3 herniated discs in lower back)


Neurologic:  Yes


Psychiatric:  Yes


Reproductive:  No


Respiratory:  Yes


Immunizations Current:  Yes


Migraines:  Yes


Myocardial Infarction:  No


Pneumonia:  Yes


Radiation Therapy:  No


Renal Failure:  No


Seizures:  No


Sickle Cell Disease:  No


Sleep Apnea:  Yes


Thyroid Disease:  No


Ulcer:  No


PNEUMOCCOCAL Vaccine (Year):  2





Past Surgical History


Abdominal Surgery:  Yes (SPLENECTOMY)


AICD:  No


Arteriovenous Shunt:  No


Body Medical Devices:  pins and plates left ankle


Cardiac Surgery:  No


Ear Surgery:  No


Endocrine Surgery:  No


Eye Surgery:  No


Genitourinary Surgery:  No


Gynecologic Surgery:  No


Insulin Pump:  No


Joint Replacement:  No


Neurologic Surgery:  No


Oral Surgery:  No


Pacemaker:  No


Thoracic Surgery:  Yes


Other Surgery:  Yes (spleenectomy )





Social History


Alcohol Use:  Yes ("occasionally" )


Tobacco Use:  Yes (1  PPD )


Substance Use:  Yes (cocaine, 6 years ago)





Allergies-Medications


(Allergen,Severity, Reaction):  


Coded Allergies:  


     bee venom protein (honey bee) (Verified  Allergy, Severe, Anaphylaxis, 5/4/ 18)


     No Known Allergies (Unverified  Allergy, Unknown, 5/4/18)


Reported Meds & Prescriptions





Reported Meds & Active Scripts


Active


Pantoprazole (Pantoprazole Sodium) 40 Mg Tab 40 Mg PO DAILY


Reported


Proair Hfa 8.5 GM Inh (Albuterol Sulfate) 90 Mcg/Act Aer 1 Puff INH Q4H PRN


     108 mcg/actuation


Advair Diskus Inh (Fluticasone-Salmeterol Inh) 250-50 Mcg/Blist Aer 2 Puff INH 

BID


     Rinse mouth after use.


Norco (Hydrocodone-Acetaminophen)  Mg Tab 1 Tab PO Q4H PRN


Triumeq (Abacavir-Dolutegravir-Lamivudine) 600- Mg Tab 1 Tab PO DAILY


     Hazardous agent; use appropriate precautions for handling & disposal.








Review of Systems


Except as stated in HPI:  all other systems reviewed are Neg





Physical Exam


Narrative


GENERAL: 49yo M not in distress.  


SKIN: Focused skin assessment warm/dry.


HEAD: Atraumatic. Normocephalic. 


EYES: Pupils equal and round. No scleral icterus. No injection or drainage. 


ENT: No nasal bleeding or discharge.  Mucous membranes pink and moist.


NECK: Trachea midline. No JVD. 


CARDIOVASCULAR: Regular rate and rhythm.  No murmur appreciated.


RESPIRATORY: No accessory muscle use. Clear to auscultation. Breath sounds 

equal bilaterally. 


GASTROINTESTINAL: Abdomen soft, +TTP RUQ.  Pt said he always has pain from his 

Hep C.


MUSCULOSKELETAL: No obvious deformities. No clubbing.  No cyanosis.  No edema. 


NEUROLOGICAL: Lethargic but arousable and answers questions.  No focal 

neurologic deficits.





Data


Data


Last Documented VS





Vital Signs








  Date Time  Temp Pulse Resp B/P (MAP) Pulse Ox O2 Delivery O2 Flow Rate FiO2


 


6/10/18 12:30  66 18 116/66 (83) 99 Room Air  


 


6/10/18 04:17       2.00 


 


6/10/18 04:12 97.4       








Orders





 Orders


Electrocardiogram (6/10/18 03:55)


Complete Blood Count With Diff (6/10/18 03:55)


Comprehensive Metabolic Panel (6/10/18 03:55)


Prothrombin Time / Inr (Pt) (6/10/18 03:55)


Act Partial Throm Time (Ptt) (6/10/18 03:55)


Urinalysis - C+S If Indicated (6/10/18 03:55)


Arterial Blood Gas (Abg) (6/10/18 03:55)


Blood Glucose (6/10/18 03:55)


Sodium Chlor 0.9% 1000 Ml Inj (Ns 1000 M (6/10/18 03:55)


Call Poison Control (6/10/18 03:55)


Drug Screen, Random Urine (6/10/18 03:55)


Alcohol (Ethanol) (6/10/18 03:55)


Salicylates (Aspirin) (6/10/18 03:55)


Tylenol (Acetaminophen) (6/10/18 03:55)


Ammonia (6/10/18 03:55)


Valproic Acid (Depakene) (6/10/18 04:00)


Psych Screen (6/10/18 05:52)


Valproic Acid (Depakene) (6/10/18 06:20)


Electrocardiogram (6/10/18 )


Valproic Acid (Depakene) (6/10/18 09:00)


Valproic Acid (Depakene) (6/10/18 12:00)


Valproic Acid (Depakene) (6/10/18 15:00)


Electrocardiogram (6/10/18 08:57)


Electrocardiogram (6/10/18 12:00)


^ Other Nursing Orders (6/10/18 15:08)


Admit Order (Ed Use Only) (6/10/18 )


Albuterol Hfa Inh (Proair Hfa Inh) (6/10/18 15:15)


Pantoprazole (Protonix) (6/11/18 09:00)


Budeson-Formot 160-4.5 Mcg Inh (Symbicor (6/10/18 21:00)





Labs





Laboratory Tests








Test


  6/10/18


03:55 6/10/18


04:10 6/10/18


06:15 6/10/18


08:55


 


Blood Gas Puncture Site RT RADIAL    


 


Blood Gas Patient Temperature 98.6    


 


Blood Gas HCO3 19 mmol/L    


 


Blood Gas Base Excess -4.7 mmol/L    


 


Blood Gas Oxygen Saturation 89 %    


 


Arterial Blood pH 7.40    


 


Arterial Blood Partial


Pressure CO2 32 mmHg 


  


  


  


 


 


Arterial Blood Partial


Pressure O2 83 mmHG 


  


  


  


 


 


Arterial Blood Oxygen Content 23.6 Vol %    


 


Arterial Blood


Carboxyhemoglobin 6.2 % 


  


  


  


 


 


Arterial Blood Methemoglobin 0.8 %    


 


Blood Gas Hemoglobin 18.8 G/DL    


 


White Blood Count  18.7 TH/MM3   


 


Red Blood Count  6.02 MIL/MM3   


 


Hemoglobin  18.5 GM/DL   


 


Hematocrit  53.5 %   


 


Mean Corpuscular Volume  88.8 FL   


 


Mean Corpuscular Hemoglobin  30.6 PG   


 


Mean Corpuscular Hemoglobin


Concent 


  34.5 % 


  


  


 


 


Red Cell Distribution Width  13.9 %   


 


Platelet Count  122 TH/MM3   


 


Mean Platelet Volume  10.7 FL   


 


Neutrophils (%) (Auto)  70.3 %   


 


Lymphocytes (%) (Auto)  21.7 %   


 


Monocytes (%) (Auto)  7.2 %   


 


Eosinophils (%) (Auto)  0.3 %   


 


Basophils (%) (Auto)  0.5 %   


 


Neutrophils # (Auto)  13.1 TH/MM3   


 


Lymphocytes # (Auto)  4.1 TH/MM3   


 


Monocytes # (Auto)  1.4 TH/MM3   


 


Eosinophils # (Auto)  0.1 TH/MM3   


 


Basophils # (Auto)  0.1 TH/MM3   


 


CBC Comment  DIFF FINAL   


 


Differential Comment     


 


Prothrombin Time  11.4 SEC   


 


Prothromb Time International


Ratio 


  1.1 RATIO 


  


  


 


 


Activated Partial


Thromboplast Time 


  30.9 SEC 


  


  


 


 


Blood Urea Nitrogen  18 MG/DL   


 


Creatinine  1.36 MG/DL   


 


Random Glucose  88 MG/DL   


 


Total Protein  7.2 GM/DL   


 


Albumin  3.5 GM/DL   


 


Calcium Level  8.6 MG/DL   


 


Alkaline Phosphatase  151 U/L   


 


Aspartate Amino Transf


(AST/SGOT) 


  48 U/L 


  


  


 


 


Alanine Aminotransferase


(ALT/SGPT) 


  39 U/L 


  


  


 


 


Total Bilirubin  0.5 MG/DL   


 


Sodium Level  143 MEQ/L   


 


Potassium Level  3.2 MEQ/L   


 


Chloride Level  110 MEQ/L   


 


Carbon Dioxide Level  18.1 MEQ/L   


 


Anion Gap  15 MEQ/L   


 


Estimat Glomerular Filtration


Rate 


  55 ML/MIN 


  


  


 


 


Ammonia  30 MCMOL/L   


 


Salicylates Level  5.1 MG/DL   


 


Acetaminophen Level


  


  LESS THAN 2.0


MCG/ML 


  


 


 


Valproic Acid (Depakene) Level  60 MCG/ML  96 MCG/ML  92 MCG/ML 


 


Ethyl Alcohol Level  46 MG/DL   


 


Test


  6/10/18


12:00 6/10/18


15:08 


  


 


 


Valproic Acid (Depakene) Level 99 MCG/ML  102 MCG/ML   











[facility]


Medical Decision Making


Medical Screen Exam Complete:  Yes


Emergency Medical Condition:  Yes


Interpretation(s)


EKG: NSR 90bpm.  Normal axis.  Narrow QRS.  QTc 444ms.  No significant ST 

elevation or depression.


EKG #2: NSR 73bpm.  Normal axis.  Narrow QRS.  QTc 427ms.


Differential Diagnosis


Depakote overdose vs. polysubstance overdose vs. suicidal ideation vs. 

electrolyte abnormality


Narrative Course


49yo M here under [place] Act after he tried to kill himself by ingesting #30 

divalporex ER.  Pt is lethargic but easily arousable and answers questions.  

Also follows commands.  ABG showed O2 sat of 89% on RA so place on 2L NC.  PH 

is normal at 7.40.  Metabolic acidosis with respiratory compensation.  Labs 

reviewed, leukocytosis at 18.7.  H/H elevated at 18.5/53.5. Pt given NS IVF.  

Mild hypokalemia at 3.2, replaced with KCl IV.  Creatinine mildly elevated at 

1.36.  AST, alk phos is elevated but at baseline.  Ammonia level is 30.  

Alcohol 46.  Acetaminophen less than 2.0.  Salicylate level 5.1.  Valproic acid 

level normal at 60.  Pt has chronic abdominal pain for years and last CT a/p 5/4 /18 showed no acute findings.  Pt is resting comfortably so will not repeat CT.

  





Poison control contacted and recommended supportive care and to repeat valproic 

acid level Q2 hours for 2 times after the first because this is extended 

release and may be normal initially.  Recommend to also repeat EKG Q2 hours for 

2 more times.  I discussed with Dr. Saeed who said that pt does not need to be 

admitted and can have repeat EKG and valproic acid in the ED.  Repeat EKG #2 

completed.  Valproic acid #2 is drawn and pending.  Sign out to next team to 

follow up and draw one more set before medically clearing pt for psych 

evaluation.





Diagnosis





 Primary Impression:  


 Overdose


 Qualified Codes:  T50.902A - Poisoning by unspecified drugs, medicaments and 

biological substances, intentional self-harm, initial encounter











Shakira Fuentes DO Marty 10, 2018 04:06

## 2018-06-10 NOTE — PD.CONS
HPI


Service


[facility]


Consult Requested By


 











 


Dr. Christianson


Reason for Consult


Depakote overdose


Primary Care Physician


No Primary Care Physician


Diagnoses:  


History of Present Illness


This is a 48-year-old male with history of HIV with CD4 count in the 700s, 

hepatitis C, ITP and COPD who presented emergency department [place] acted after 

intentional overdose with 30 pills of Depakote  mg each.  Upon initial ED 

evaluation, the patient was lethargic but arousable.  Per patient, he was an 

intentional overdose.  Presently, he is sleepy but easily arousable.  He denies 

any chest pain or palpitations, focal weakness, numbness, nausea, vomiting or 

shortness of breath.  He had serial EKGs in the emergency department, he had 

one EKG with QT prolongation of 461 ms.  Patient was asymptomatic.  Depakote 

levels have been relatively stable within therapeutic range.





Review of Systems


ROS Limitations:  Other (All other pertinent systems were reviewed and are 

negative.)





Past Family Social History


Allergies:  


Coded Allergies:  


     bee venom protein (honey bee) (Verified  Allergy, Severe, Anaphylaxis, 5/4/ 18)


     No Known Allergies (Unverified  Allergy, Unknown, 5/4/18)


Past Medical History





HIV with CD4 count in the 700s


Hepatitis C


ITP


COPD


PTSD


Past Surgical History


Splenectomy


Left ankle surgery next


Reported Medications


Pantoprazole (Pantoprazole Sodium) 40 Mg Tab 40 Mg PO DAILY


Proair Hfa 8.5 GM Inh (Albuterol Sulfate) 90 Mcg/Act Aer 1 Puff INH Q4H PRN


     108 mcg/actuation


Advair Diskus Inh (Fluticasone-Salmeterol Inh) 250-50 Mcg/Blist Aer 2 Puff INH 

BID


     Rinse mouth after use.


Norco (Hydrocodone-Acetaminophen)  Mg Tab 1 Tab PO Q4H PRN


Triumeq (Abacavir-Dolutegravir-Lamivudine) 600- Mg Tab 1 Tab PO DAILY


     Hazardous agent; use appropriate precautions for handling & disposal.


Family History


Mother had breast cancer


Social History


Smokes a pack a day since he was 10 years old





Physical Exam


Vital Signs





Vital Signs








  Date Time  Temp Pulse Resp B/P (MAP) Pulse Ox O2 Delivery O2 Flow Rate FiO2


 


6/10/18 16:21  71 18 110/73 (85) 98 Room Air  


 


6/10/18 12:30  66 18 116/66 (83) 99 Room Air  


 


6/10/18 07:30  85 18 127/71 (89) 99 Room Air  


 


6/10/18 04:17     94 Nasal Cannula 2.00 


 


6/10/18 04:17     98 Nasal Cannula 2.00 


 


6/10/18 04:12 97.4 87 10 127/80 (96) 97   








Physical Exam


Not in distress, well-nourished, looks stated age, a little unkempt


PERRL,  pink conjunctiva without injection, anicteric


Nose without bleeding, airway patent


Supple neck, no masses or thyromegaly, trachea midline


Normal rate and regular rhythm, no murmurs gallops or rubs appreciated. 


Clear to auscultation and symmetric bilaterally, normal respiratory effort.  


Normal bowel sounds, soft, non-tender, nondistended, no guarding. 


Extremities without clubbing, cyanosis, or edema. 


No rash of generalized distribution.  Skin is warm and dry.


AAO x3, no cranial nerve deficits, moves all 4 extremities, no focal neurologic 

deficits


Laboratory





Laboratory Tests








Test


  6/10/18


03:55 6/10/18


04:10 6/10/18


06:15 6/10/18


08:55


 


Blood Gas Puncture Site RT RADIAL    


 


Blood Gas Patient Temperature 98.6    


 


Blood Gas HCO3 19    


 


Blood Gas Base Excess -4.7    


 


Blood Gas Oxygen Saturation 89    


 


Arterial Blood pH 7.40    


 


Arterial Blood Partial


Pressure CO2 32 


  


  


  


 


 


Arterial Blood Partial


Pressure O2 83 


  


  


  


 


 


Arterial Blood Oxygen Content 23.6    


 


Arterial Blood


Carboxyhemoglobin 6.2 


  


  


  


 


 


Arterial Blood Methemoglobin 0.8    


 


Blood Gas Hemoglobin 18.8    


 


White Blood Count  18.7   


 


Red Blood Count  6.02   


 


Hemoglobin  18.5   


 


Hematocrit  53.5   


 


Mean Corpuscular Volume  88.8   


 


Mean Corpuscular Hemoglobin  30.6   


 


Mean Corpuscular Hemoglobin


Concent 


  34.5 


  


  


 


 


Red Cell Distribution Width  13.9   


 


Platelet Count  122   


 


Mean Platelet Volume  10.7   


 


Neutrophils (%) (Auto)  70.3   


 


Lymphocytes (%) (Auto)  21.7   


 


Monocytes (%) (Auto)  7.2   


 


Eosinophils (%) (Auto)  0.3   


 


Basophils (%) (Auto)  0.5   


 


Neutrophils # (Auto)  13.1   


 


Lymphocytes # (Auto)  4.1   


 


Monocytes # (Auto)  1.4   


 


Eosinophils # (Auto)  0.1   


 


Basophils # (Auto)  0.1   


 


CBC Comment  DIFF FINAL   


 


Differential Comment     


 


Prothrombin Time  11.4   


 


Prothromb Time International


Ratio 


  1.1 


  


  


 


 


Activated Partial


Thromboplast Time 


  30.9 


  


  


 


 


Blood Urea Nitrogen  18   


 


Creatinine  1.36   


 


Random Glucose  88   


 


Total Protein  7.2   


 


Albumin  3.5   


 


Calcium Level  8.6   


 


Alkaline Phosphatase  151   


 


Aspartate Amino Transf


(AST/SGOT) 


  48 


  


  


 


 


Alanine Aminotransferase


(ALT/SGPT) 


  39 


  


  


 


 


Total Bilirubin  0.5   


 


Sodium Level  143   


 


Potassium Level  3.2   


 


Chloride Level  110   


 


Carbon Dioxide Level  18.1   


 


Anion Gap  15   


 


Estimat Glomerular Filtration


Rate 


  55 


  


  


 


 


Ammonia  30   


 


Salicylates Level  5.1   


 


Acetaminophen Level  LESS THAN 2.0   


 


Valproic Acid (Depakene) Level  60  96  92 


 


Ethyl Alcohol Level  46   


 


Test


  6/10/18


12:00 6/10/18


15:08 


  


 


 


Valproic Acid (Depakene) Level 99  102   








Result Diagram:  


6/10/18 0410                                                                   

             6/10/18 0410








Assessment and Plan


Assessment and Plan


This is a 48-year-old male, will be admitted to the med psych unit for Depakote 

overdose.





Attempted suicide-further management per psychiatry





Depakote toxicity secondary to intentional overdose -serial EKG have been done, 

longest corrected QT is 461 ms, continue to monitor daily.  Depakote within 

normal limits, monitor and recheck tomorrow, check LFTs, ammonia and CMP.  

Neurochecks





HIV-allegedly last CD4 count is more than 700.  Continue HAART medications.





COPD-restart Advair with pro-air as needed.





Dehydration-encourage oral fluid intake, recheck BMP and CBC tomorrow.





DVT prophylaxis: Low risk, ambulatory.











Flory Gordon MD Marty 10, 2018 16:54

## 2018-06-10 NOTE — EKG
Date Performed: 06/10/2018       Time Performed: 04:20:49

 

PTAGE:      48 years

 

EKG:      Sinus rhythm 

 

 POSSIBLE RIGHT ATRIAL ENLARGEMENT BORDERLINE ECG

 

PREVIOUS TRACING       : 05/04/2018 12.55 Since the previous tracing, no significant change noted

 

DOCTOR:   Aden Burkett  Interpretating Date/Time  06/10/2018 14:27:50

## 2018-06-10 NOTE — EKG
Date Performed: 06/10/2018       Time Performed: 06:23:35

 

PTAGE:      48 years

 

EKG:      Sinus rhythm 

 

 NORMAL ECG

 

PREVIOUS TRACING       : 06/10/2018 04.20 Since the previous tracing, no significant change noted

 

DOCTOR:   Aden Burkett  Interpretating Date/Time  06/10/2018 14:26:22

## 2018-06-10 NOTE — PD
Physical Exam


Date Seen by Provider:  Marty 10, 2018


Time Seen by Provider:  07:14


Narrative


The patient is a 48-year-old male who was initially evaluated by the previous 

physician.  Please refer to the initial history, physical, diagnostic evaluation

, and treatment modality plan.  The patient was signed out at 7 AM with repeat 

Depakote levels pending per poison control recommendations.





Data


Data


Last Documented VS





Vital Signs








  Date Time  Temp Pulse Resp B/P (MAP) Pulse Ox O2 Delivery O2 Flow Rate FiO2


 


6/10/18 12:30  66 18 116/66 (83) 99 Room Air  


 


6/10/18 04:17       2.00 


 


6/10/18 04:12 97.4       








Orders





 Orders


Electrocardiogram (6/10/18 03:55)


Complete Blood Count With Diff (6/10/18 03:55)


Comprehensive Metabolic Panel (6/10/18 03:55)


Prothrombin Time / Inr (Pt) (6/10/18 03:55)


Act Partial Throm Time (Ptt) (6/10/18 03:55)


Urinalysis - C+S If Indicated (6/10/18 03:55)


Arterial Blood Gas (Abg) (6/10/18 03:55)


Blood Glucose (6/10/18 03:55)


Sodium Chlor 0.9% 1000 Ml Inj (Ns 1000 M (6/10/18 03:55)


Call Poison Control (6/10/18 03:55)


Drug Screen, Random Urine (6/10/18 03:55)


Alcohol (Ethanol) (6/10/18 03:55)


Salicylates (Aspirin) (6/10/18 03:55)


Tylenol (Acetaminophen) (6/10/18 03:55)


Ammonia (6/10/18 03:55)


Valproic Acid (Depakene) (6/10/18 04:00)


Psych Screen (6/10/18 05:52)


Valproic Acid (Depakene) (6/10/18 06:20)


Electrocardiogram (6/10/18 )


Valproic Acid (Depakene) (6/10/18 09:00)


Valproic Acid (Depakene) (6/10/18 12:00)


Valproic Acid (Depakene) (6/10/18 15:00)


Electrocardiogram (6/10/18 08:57)


Electrocardiogram (6/10/18 12:00)


^ Other Nursing Orders (6/10/18 15:08)


Admit Order (Ed Use Only) (6/10/18 )


Albuterol Hfa Inh (Proair Hfa Inh) (6/10/18 15:15)


Pantoprazole (Protonix) (6/11/18 09:00)


(Nf) Abacavir-Dolutegravir-Lamivudine (T (6/11/18 09:00)


Budeson-Formot 160-4.5 Mcg Inh (Symbicor (6/10/18 21:00)





Labs





Laboratory Tests








Test


  6/10/18


03:55 6/10/18


04:10 6/10/18


06:15 6/10/18


08:55


 


Blood Gas Puncture Site RT RADIAL    


 


Blood Gas Patient Temperature 98.6    


 


Blood Gas HCO3 19 mmol/L    


 


Blood Gas Base Excess -4.7 mmol/L    


 


Blood Gas Oxygen Saturation 89 %    


 


Arterial Blood pH 7.40    


 


Arterial Blood Partial


Pressure CO2 32 mmHg 


  


  


  


 


 


Arterial Blood Partial


Pressure O2 83 mmHG 


  


  


  


 


 


Arterial Blood Oxygen Content 23.6 Vol %    


 


Arterial Blood


Carboxyhemoglobin 6.2 % 


  


  


  


 


 


Arterial Blood Methemoglobin 0.8 %    


 


Blood Gas Hemoglobin 18.8 G/DL    


 


White Blood Count  18.7 TH/MM3   


 


Red Blood Count  6.02 MIL/MM3   


 


Hemoglobin  18.5 GM/DL   


 


Hematocrit  53.5 %   


 


Mean Corpuscular Volume  88.8 FL   


 


Mean Corpuscular Hemoglobin  30.6 PG   


 


Mean Corpuscular Hemoglobin


Concent 


  34.5 % 


  


  


 


 


Red Cell Distribution Width  13.9 %   


 


Platelet Count  122 TH/MM3   


 


Mean Platelet Volume  10.7 FL   


 


Neutrophils (%) (Auto)  70.3 %   


 


Lymphocytes (%) (Auto)  21.7 %   


 


Monocytes (%) (Auto)  7.2 %   


 


Eosinophils (%) (Auto)  0.3 %   


 


Basophils (%) (Auto)  0.5 %   


 


Neutrophils # (Auto)  13.1 TH/MM3   


 


Lymphocytes # (Auto)  4.1 TH/MM3   


 


Monocytes # (Auto)  1.4 TH/MM3   


 


Eosinophils # (Auto)  0.1 TH/MM3   


 


Basophils # (Auto)  0.1 TH/MM3   


 


CBC Comment  DIFF FINAL   


 


Differential Comment     


 


Prothrombin Time  11.4 SEC   


 


Prothromb Time International


Ratio 


  1.1 RATIO 


  


  


 


 


Activated Partial


Thromboplast Time 


  30.9 SEC 


  


  


 


 


Blood Urea Nitrogen  18 MG/DL   


 


Creatinine  1.36 MG/DL   


 


Random Glucose  88 MG/DL   


 


Total Protein  7.2 GM/DL   


 


Albumin  3.5 GM/DL   


 


Calcium Level  8.6 MG/DL   


 


Alkaline Phosphatase  151 U/L   


 


Aspartate Amino Transf


(AST/SGOT) 


  48 U/L 


  


  


 


 


Alanine Aminotransferase


(ALT/SGPT) 


  39 U/L 


  


  


 


 


Total Bilirubin  0.5 MG/DL   


 


Sodium Level  143 MEQ/L   


 


Potassium Level  3.2 MEQ/L   


 


Chloride Level  110 MEQ/L   


 


Carbon Dioxide Level  18.1 MEQ/L   


 


Anion Gap  15 MEQ/L   


 


Estimat Glomerular Filtration


Rate 


  55 ML/MIN 


  


  


 


 


Ammonia  30 MCMOL/L   


 


Salicylates Level  5.1 MG/DL   


 


Acetaminophen Level


  


  LESS THAN 2.0


MCG/ML 


  


 


 


Valproic Acid (Depakene) Level  60 MCG/ML  96 MCG/ML  92 MCG/ML 


 


Ethyl Alcohol Level  46 MG/DL   


 


Test


  6/10/18


12:00 6/10/18


15:08 


  


 


 


Valproic Acid (Depakene) Level 99 MCG/ML    











[facility]


Medical Record Reviewed:  Yes


Supervised Visit with CHERYL:  No


Interpretation(s)


EKG #3 reveals normal sinus rhythm with a rate of 75.  Prolonged QT with QTC of 

461 ms.


EKG #4 reveals normal sinus rhythm with a 137.








Laboratory Tests








Test


  6/10/18


03:55 6/10/18


04:10 6/10/18


06:15 6/10/18


08:55


 


Blood Gas Puncture Site RT RADIAL    


 


Blood Gas Patient Temperature 98.6    


 


Blood Gas HCO3 19 mmol/L    


 


Blood Gas Base Excess -4.7 mmol/L    


 


Blood Gas Oxygen Saturation 89 %    


 


Arterial Blood pH 7.40    


 


Arterial Blood Partial


Pressure CO2 32 mmHg 


  


  


  


 


 


Arterial Blood Partial


Pressure O2 83 mmHG 


  


  


  


 


 


Arterial Blood Oxygen Content 23.6 Vol %    


 


Arterial Blood


Carboxyhemoglobin 6.2 % 


  


  


  


 


 


Arterial Blood Methemoglobin 0.8 %    


 


Blood Gas Hemoglobin 18.8 G/DL    


 


White Blood Count  18.7 TH/MM3   


 


Red Blood Count  6.02 MIL/MM3   


 


Hemoglobin  18.5 GM/DL   


 


Hematocrit  53.5 %   


 


Mean Corpuscular Volume  88.8 FL   


 


Mean Corpuscular Hemoglobin  30.6 PG   


 


Mean Corpuscular Hemoglobin


Concent 


  34.5 % 


  


  


 


 


Red Cell Distribution Width  13.9 %   


 


Platelet Count  122 TH/MM3   


 


Mean Platelet Volume  10.7 FL   


 


Neutrophils (%) (Auto)  70.3 %   


 


Lymphocytes (%) (Auto)  21.7 %   


 


Monocytes (%) (Auto)  7.2 %   


 


Eosinophils (%) (Auto)  0.3 %   


 


Basophils (%) (Auto)  0.5 %   


 


Neutrophils # (Auto)  13.1 TH/MM3   


 


Lymphocytes # (Auto)  4.1 TH/MM3   


 


Monocytes # (Auto)  1.4 TH/MM3   


 


Eosinophils # (Auto)  0.1 TH/MM3   


 


Basophils # (Auto)  0.1 TH/MM3   


 


CBC Comment  DIFF FINAL   


 


Differential Comment     


 


Prothrombin Time  11.4 SEC   


 


Prothromb Time International


Ratio 


  1.1 RATIO 


  


  


 


 


Activated Partial


Thromboplast Time 


  30.9 SEC 


  


  


 


 


Blood Urea Nitrogen  18 MG/DL   


 


Creatinine  1.36 MG/DL   


 


Random Glucose  88 MG/DL   


 


Total Protein  7.2 GM/DL   


 


Albumin  3.5 GM/DL   


 


Calcium Level  8.6 MG/DL   


 


Alkaline Phosphatase  151 U/L   


 


Aspartate Amino Transf


(AST/SGOT) 


  48 U/L 


  


  


 


 


Alanine Aminotransferase


(ALT/SGPT) 


  39 U/L 


  


  


 


 


Total Bilirubin  0.5 MG/DL   


 


Sodium Level  143 MEQ/L   


 


Potassium Level  3.2 MEQ/L   


 


Chloride Level  110 MEQ/L   


 


Carbon Dioxide Level  18.1 MEQ/L   


 


Anion Gap  15 MEQ/L   


 


Estimat Glomerular Filtration


Rate 


  55 ML/MIN 


  


  


 


 


Ammonia  30 MCMOL/L   


 


Salicylates Level  5.1 MG/DL   


 


Acetaminophen Level


  


  LESS THAN 2.0


MCG/ML 


  


 


 


Valproic Acid (Depakene) Level  60 MCG/ML  96 MCG/ML  92 MCG/ML 


 


Ethyl Alcohol Level  46 MG/DL   


 


Test


  6/10/18


12:00 6/10/18


15:08 


  


 


 


Valproic Acid (Depakene) Level 99 MCG/ML    








Differential Diagnosis


Differential diagnosis includes Depakote overdose, substance-induced mood 

disorder, alcohol intoxication, depressive disorder, adjustment reaction, 

stress reaction, polysubstance abuse.


Narrative Course


The patient was initially evaluated by the previous physician.  Please refer to 

the initial history, physical, diagnostic evaluation, and treatment modality 

plan.  The patient was signed out at 7 AM with repeat Depakote levels pending.  

The Depakote levels are within normal limits the patient be medically cleared 

to be evaluated by psychiatry.





The patient's second Depakote level was elevated, therefore, the third Depakote 

level was ordered for 9 AM.  Initial QTC was 444 corrected, the second EKG 

revealed a QTC of 427, however, the third EKG did reveal a prolonged QTC of 461 

ms.





I discussed the patient with a psychiatrist, we both agree that the levels are 

in the therapeutic range, less than 100, patient will be admitted to the 

psychiatric unit.  If the Depakote level does increase, the patient may need a 

medical consultation.  The patient is stable.


Diagnosis





 Primary Impression:  


 Overdose


 Qualified Codes:  T50.902A - Poisoning by unspecified drugs, medicaments and 

biological substances, intentional self-harm, initial encounter





Admitting Information


Admitting Physician Requests:  Admit


Condition:  Stable











Gilmar Caraballo MD Marty 10, 2018 07:15

## 2018-06-11 VITALS
SYSTOLIC BLOOD PRESSURE: 122 MMHG | TEMPERATURE: 96.8 F | RESPIRATION RATE: 16 BRPM | DIASTOLIC BLOOD PRESSURE: 73 MMHG | HEART RATE: 71 BPM | OXYGEN SATURATION: 97 %

## 2018-06-11 VITALS
HEART RATE: 61 BPM | SYSTOLIC BLOOD PRESSURE: 105 MMHG | RESPIRATION RATE: 16 BRPM | OXYGEN SATURATION: 98 % | TEMPERATURE: 98.4 F | DIASTOLIC BLOOD PRESSURE: 63 MMHG

## 2018-06-11 VITALS
SYSTOLIC BLOOD PRESSURE: 112 MMHG | RESPIRATION RATE: 16 BRPM | HEART RATE: 74 BPM | OXYGEN SATURATION: 97 % | TEMPERATURE: 98.6 F | DIASTOLIC BLOOD PRESSURE: 69 MMHG

## 2018-06-11 VITALS
RESPIRATION RATE: 16 BRPM | HEART RATE: 68 BPM | DIASTOLIC BLOOD PRESSURE: 63 MMHG | SYSTOLIC BLOOD PRESSURE: 114 MMHG | OXYGEN SATURATION: 96 % | TEMPERATURE: 99.7 F

## 2018-06-11 VITALS
RESPIRATION RATE: 18 BRPM | HEART RATE: 69 BPM | TEMPERATURE: 98.9 F | DIASTOLIC BLOOD PRESSURE: 76 MMHG | SYSTOLIC BLOOD PRESSURE: 118 MMHG

## 2018-06-11 LAB
ALBUMIN SERPL-MCNC: 2.8 GM/DL (ref 3.4–5)
ALP SERPL-CCNC: 147 U/L (ref 45–117)
ALT SERPL-CCNC: 32 U/L (ref 12–78)
AST SERPL-CCNC: 38 U/L (ref 15–37)
BASOPHILS # BLD AUTO: 0.1 TH/MM3 (ref 0–0.2)
BASOPHILS NFR BLD: 1.1 % (ref 0–2)
BILIRUB SERPL-MCNC: 0.4 MG/DL (ref 0.2–1)
BUN SERPL-MCNC: 19 MG/DL (ref 7–18)
CALCIUM SERPL-MCNC: 8.1 MG/DL (ref 8.5–10.1)
CHLORIDE SERPL-SCNC: 109 MEQ/L (ref 98–107)
CHOLEST SERPL-MCNC: 79 MG/DL (ref 120–200)
CHOLESTEROL/ HDL RATIO: 1.81 RATIO
CREAT SERPL-MCNC: 1.05 MG/DL (ref 0.6–1.3)
EOSINOPHIL # BLD: 0.4 TH/MM3 (ref 0–0.4)
EOSINOPHIL NFR BLD: 4.8 % (ref 0–4)
ERYTHROCYTE [DISTWIDTH] IN BLOOD BY AUTOMATED COUNT: 13.9 % (ref 11.6–17.2)
GFR SERPLBLD BASED ON 1.73 SQ M-ARVRAT: 75 ML/MIN (ref 89–?)
GLUCOSE SERPL-MCNC: 77 MG/DL (ref 74–106)
HBA1C MFR BLD: 5 % (ref 4.3–6)
HCO3 BLD-SCNC: 22.6 MEQ/L (ref 21–32)
HCT VFR BLD CALC: 52.7 % (ref 39–51)
HDLC SERPL-MCNC: 43.6 MG/DL (ref 40–60)
HGB BLD-MCNC: 17.8 GM/DL (ref 13–17)
LDLC SERPL-MCNC: 24 MG/DL (ref 0–99)
LYMPHOCYTES # BLD AUTO: 3.1 TH/MM3 (ref 1–4.8)
LYMPHOCYTES NFR BLD AUTO: 37.6 % (ref 9–44)
MCH RBC QN AUTO: 30.3 PG (ref 27–34)
MCHC RBC AUTO-ENTMCNC: 33.7 % (ref 32–36)
MCV RBC AUTO: 89.8 FL (ref 80–100)
MONOCYTE #: 0.8 TH/MM3 (ref 0–0.9)
MONOCYTES NFR BLD: 9.5 % (ref 0–8)
NEUTROPHILS # BLD AUTO: 3.9 TH/MM3 (ref 1.8–7.7)
NEUTROPHILS NFR BLD AUTO: 47 % (ref 16–70)
PLATELET # BLD: 110 TH/MM3 (ref 150–450)
PMV BLD AUTO: 9.5 FL (ref 7–11)
PROT SERPL-MCNC: 6.2 GM/DL (ref 6.4–8.2)
RBC # BLD AUTO: 5.87 MIL/MM3 (ref 4.5–5.9)
SODIUM SERPL-SCNC: 141 MEQ/L (ref 136–145)
TRIGL SERPL-MCNC: 58 MG/DL (ref 42–150)
WBC # BLD AUTO: 8.3 TH/MM3 (ref 4–11)

## 2018-06-11 RX ADMIN — MULTIPLE VITAMINS W/ MINERALS TAB SCH TAB: TAB at 10:01

## 2018-06-11 RX ADMIN — DOLUTEGRAVIR SODIUM SCH MG: 50 TABLET, FILM COATED ORAL at 10:01

## 2018-06-11 RX ADMIN — BUDESONIDE AND FORMOTEROL FUMARATE DIHYDRATE SCH PUFF: 160; 4.5 AEROSOL RESPIRATORY (INHALATION) at 21:00

## 2018-06-11 RX ADMIN — FOLIC ACID SCH MG: 1 TABLET ORAL at 10:01

## 2018-06-11 RX ADMIN — ABACAVIR SULFATE SCH MG: 300 TABLET, FILM COATED ORAL at 10:01

## 2018-06-11 RX ADMIN — ESCITALOPRAM OXALATE SCH MG: 10 TABLET, FILM COATED ORAL at 10:01

## 2018-06-11 RX ADMIN — PANTOPRAZOLE SCH MG: 40 TABLET, DELAYED RELEASE ORAL at 10:01

## 2018-06-11 RX ADMIN — PRAZOSIN HYDROCHLORIDE SCH MG: 1 CAPSULE ORAL at 21:00

## 2018-06-11 RX ADMIN — Medication SCH MG: at 10:02

## 2018-06-11 RX ADMIN — BUDESONIDE AND FORMOTEROL FUMARATE DIHYDRATE SCH PUFF: 160; 4.5 AEROSOL RESPIRATORY (INHALATION) at 10:01

## 2018-06-11 NOTE — EKG
Date Performed: 06/11/2018       Time Performed: 04:56:32

 

PTAGE:      48 years

 

EKG:      Sinus rhythm 

 

 SEPTAL MYOCARDIAL INFARCTION , OF INDETERMINATE AGE ABNORMAL ECG

 

PREVIOUS TRACING       : 06/10/2018 15.24 Consider anteroseptal myocardial infarction - age indetermi
mary jane

 

DOCTOR:   Scottie Cardoza  Interpretating Date/Time  06/11/2018 14:54:01

## 2018-06-11 NOTE — EKG
Date Performed: 06/10/2018       Time Performed: 12:00:43

 

PTAGE:      48 years

 

EKG:      Sinus rhythm 

 

 NORMAL ECG

 

PREVIOUS TRACING        06/10/18 Since the previous tracing, no significant change noted

 

DOCTOR:   Scottie Cardoza  Interpretating Date/Time  06/11/2018 14:53:29

## 2018-06-11 NOTE — HHI.PYPN
Subjective


Chief Complaint:  Depakote overdose


Remarks


Patient seen for follow-up, chart reviewed.  Discussion nursing staff reported 

the patient with limited interaction with staff, compliant with medications.  

Patient was found lying hospital bed noted B superficially cooperative stating 

that he just feels very tired and did not elaborate.  Patient states that he is 

feeling "okay" but that he feels that he is having increased anxiety at the 

same time stated that he is sleeping a lot.  Patient reports eating and 

drinking well and having spoken to his fiance recently.  When asked about 

circumstances of brought to the hospital he states "it is a long story" and did 

not elaborate.  Patient this time denying any suicide ideations continues 

report feeling down depressed due to recent relationship discord with his fianc

e.  Patient denies any perceptional service of delusions at this time.





Review of Systems


Except as stated in HPI:  all other systems reviewed are Neg





Mental Status Examination


Appearance:  Disheveled


Consciousness:  Alert


Orientation:  x4


Motor Activity:  Normal gait


Speech:  Unremarkable


Language:  Adequate


Fund of Knowledge:  Adequate


Attention and Concentration:  Adequate


Memory:  Unremarkable (Grossly intact on clinical exam)


Mood:  Other (Dysphoric)


Affect:  Other (Restricted)


Thought Process & Associations:  Intact, Logical, Linear


Thought Content:  Appropriate


Hallucination Type:  None


Delusion Type:  None


Suicidal Ideation:  No (Unclear whether patient is reliable to contract for 

safety)


Suicidal Plan:  No


Suicidal Intention:  No


Homicidal Ideation:  No


Homicidal Plan:  No


Homicidal Intention:  No


Insight:  Fair


Judgment:  Impulsive





Results


Labs











Test


  6/10/18


21:40 6/11/18


02:22 6/11/18


07:18 6/11/18


12:14


 


Valproic Acid (Depakene) Level 97 MCG/ML  92 MCG/ML  111 MCG/ML  112 MCG/ML 


 


White Blood Count   8.3 TH/MM3  


 


Red Blood Count   5.87 MIL/MM3  


 


Hemoglobin   17.8 GM/DL  


 


Hematocrit   52.7 %  


 


Mean Corpuscular Volume   89.8 FL  


 


Mean Corpuscular Hemoglobin   30.3 PG  


 


Mean Corpuscular Hemoglobin


Concent 


  


  33.7 % 


  


 


 


Red Cell Distribution Width   13.9 %  


 


Platelet Count   110 TH/MM3  


 


Mean Platelet Volume   9.5 FL  


 


Neutrophils (%) (Auto)   47.0 %  


 


Lymphocytes (%) (Auto)   37.6 %  


 


Monocytes (%) (Auto)   9.5 %  


 


Eosinophils (%) (Auto)   4.8 %  


 


Basophils (%) (Auto)   1.1 %  


 


Neutrophils # (Auto)   3.9 TH/MM3  


 


Lymphocytes # (Auto)   3.1 TH/MM3  


 


Monocytes # (Auto)   0.8 TH/MM3  


 


Eosinophils # (Auto)   0.4 TH/MM3  


 


Basophils # (Auto)   0.1 TH/MM3  


 


CBC Comment   DIFF FINAL  


 


Differential Comment     


 


Blood Urea Nitrogen   19 MG/DL  


 


Creatinine   1.05 MG/DL  


 


Random Glucose   77 MG/DL  


 


Total Protein   6.2 GM/DL  


 


Albumin   2.8 GM/DL  


 


Calcium Level   8.1 MG/DL  


 


Alkaline Phosphatase   147 U/L  


 


Aspartate Amino Transf


(AST/SGOT) 


  


  38 U/L 


  


 


 


Alanine Aminotransferase


(ALT/SGPT) 


  


  32 U/L 


  


 


 


Total Bilirubin   0.4 MG/DL  


 


Sodium Level   141 MEQ/L  


 


Potassium Level   3.8 MEQ/L  


 


Chloride Level   109 MEQ/L  


 


Carbon Dioxide Level   22.6 MEQ/L  


 


Anion Gap   9 MEQ/L  


 


Estimat Glomerular Filtration


Rate 


  


  75 ML/MIN 


  


 


 


Ammonia   41 MCMOL/L  


 


Triglycerides Level   58 MG/DL  


 


Cholesterol Level   79 MG/DL  


 


LDL Cholesterol   24 MG/DL  


 


HDL Cholesterol   43.6 MG/DL  


 


Cholesterol/HDL Ratio   1.81 RATIO  


 


Test


  6/11/18


14:49 


  


  


 


 


Valproic Acid (Depakene) Level 89 MCG/ML    








Vitals/IOs





Vital Signs








  Date Time  Temp Pulse Resp B/P (MAP) Pulse Ox O2 Delivery O2 Flow Rate FiO2


 


6/11/18 11:55 99.7 68 16 114/63 (80) 96   


 


6/10/18 16:21      Room Air  


 


6/10/18 04:17       2.00 














Intake and Output   


 


 6/11/18 6/11/18 6/12/18





 08:00 16:00 00:00


 


Intake Total 960 ml 720 ml 


 


Balance 960 ml 720 ml 











Assessment & Plan


Problem List:  


(1) Adjustment disorder with mixed disturbance of emotions and conduct


ICD Codes:  F43.25 - Adjustment disorder with mixed disturbance of emotions and 

conduct


(2) Chronic post-traumatic stress disorder (PTSD)


ICD Codes:  F43.12 - Post-traumatic stress disorder, chronic


Assessment & Plan


Patient this time continues to report feeling depressed as well as continues to 

be noted to be very dysphoric and with limited engagement interview today.  

Patient did deny suicide ideations today.  We will continue current treatment.  

We will continue to monitor with behavior.  Continue to encourage patient to 

maintain personal hygiene and participate in groups and activities.  Discharge 

planning in progress.


Justification for Cont. Inpt.


At risk for further decompensation if at lower level of care


Discharge Planning


To be determined.


Request HC Surrog/Guard Advoc?:  No











Brandon Tanner MD Jun 11, 2018 17:16

## 2018-06-11 NOTE — HHI.PR
Subjective


Remarks


Follow up on patient with intentional Depakote OD.  Patient seen and examined.  

Patient complains of still feeling "groggy".  He denies any confusion or memory 

loss.  He denies any fever or chills.  He denies any headache, dizziness or 

lightheadedness.  He denies any palpitations.  He denies any N/V or abdominal 

pain.  He denies any chest pain or shortness of breath.





Objective


Vitals





Vital Signs








  Date Time  Temp Pulse Resp B/P (MAP) Pulse Ox O2 Delivery O2 Flow Rate FiO2


 


6/11/18 05:58 98.4 61 16 105/63 (77) 98   


 


6/11/18 01:28 96.8 71 16 122/73 (89) 97   


 


6/10/18 22:05 97.0 74 15 117/58 (77) 98   


 


6/10/18 18:02 97.7 89 16 132/79 (96) 98   


 


6/10/18 16:53        


 


6/10/18 16:21  71 18 110/73 (85) 98 Room Air  


 


6/10/18 12:30  66 18 116/66 (83) 99 Room Air  














I/O      


 


 6/10/18 6/10/18 6/10/18 6/11/18 6/11/18 6/11/18





 07:00 15:00 23:00 07:00 15:00 23:00


 


Intake Total   480 ml 960 ml 720 ml 


 


Balance   480 ml 960 ml 720 ml 


 


      


 


Intake Oral   480 ml 960 ml 720 ml 


 


# Voids    1  








Result Diagram:  


6/11/18 0718                                                                   

             6/11/18 0718





Objective Remarks


GENERAL: WDWN  male patient, INAD.  Appears sleepy but is easily able 

to carry on conversation with me.  Oriented x 3.


SKIN: Focused skin assessment warm/dry, no generalized rash.


HEAD: Atraumatic. Normocephalic. 


EYES: EOMI. No scleral icterus. No injection or drainage. 


ENT: No nasal bleeding or discharge.  Mucous membranes pink and moist.


NECK: Trachea midline. 


CARDIOVASCULAR: Regular rate and rhythm.  No murmur appreciated.


RESPIRATORY: No accessory muscle use. Clear to auscultation. Breath sounds 

equal bilaterally. 


GASTROINTESTINAL: Abdomen soft, nondistended, +TTP RUQ, chronic per patient 

report.  +BS.


MUSCULOSKELETAL: No obvious deformities. No edema noted.


NEUROLOGICAL: Lethargic but arousable, answers questions appropriately.  

Oriented x 3.  No cranial nerve deficits.  Moves all extremities spontaneously.

  No focal neurologic deficits.


PSYCHIATRIC:  Calm and cooperative with exam.


Procedures


None





A/P


Assessment and Plan


48-year-old male with HIV HAART med compliant, Hep C, ITP and COPD admitted to 

the med psych unit for Depakote overdose and hospitalist services consulted for 

assistance with medical management.





Attempted suicide, intentional Depakote OD


PTSD


   -management per psychiatry





Acute metabolic encephalopathy secondary to depakote toxicity


Depakote toxicity secondary to intentional overdose 


longest corrected QT is 461 ms, last QTc 429


Valproic acid dropped from 102 to 92 but bumped up to 111 


   -monitor for QT prolongation with serial EKGs


   -continue to monitor serial Depakote levels 


   -monitor respiratory status, vital signs 


   -continue neurochecks





Hyperammonemia, mild, secondary to above


Ammonia level 41


   -give dose 30ml lactulose x 1


   -repeat ammonia level 





MARCOS, secondary to dehydration


creatinine improved 1.36 to 1.05 s/p IVF


   -encourage oral hydration


   -avoid nephrotoxic agents


   -monitor kidney function as indicated





Transaminitis, mild, secondary to depakote toxicity


   -avoid hepatotoxic agents


   -monitor LFTs as indicated, repeat CMP in am





Polysubstance abuse


Urine tox screen + benzos and cocaine


   -discussed cessation   


   -continue CIWA


   -continue on daily thiamine/MVI/folate    


   -fall and seizure precautions





Thrombocytopenia


No active bleeding noted


   -monitor for signs/sx bleeding.  


   -Repeat CBC in am      





HIV


Hep C, chronic RUQ pain


allegedly last CD4 count is more than 700


   -standard precautions


   -Continue HAART medications





COPD, not in acute exacerbation


   -monitor respiratory status


   -continue on bronchodilators


   -Pro Air as needed





DVT prophylaxis: Low risk, ambulatory.











Lazara Reyez Jun 11, 2018 10:43

## 2018-06-11 NOTE — EKG
Date Performed: 06/10/2018       Time Performed: 08:57:36

 

PTAGE:      48 years

 

EKG:      Sinus rhythm 

 

 PROLONGED QT INTERVAL ABNORMAL ECG

 

PREVIOUS TRACING        06/10/18 Since the previous tracing, no significant change noted

 

DOCTOR:   Scottie Cardoza  Interpretating Date/Time  06/11/2018 14:53:15

## 2018-06-11 NOTE — EKG
Date Performed: 06/11/2018       Time Performed: 08:11:56

 

PTAGE:      48 years

 

EKG:      Sinus rhythm 

 

 BORDERLINE RIGHT AXIS DEVIATION BORDERLINE ECG

 

PREVIOUS TRACING       : 06/11/2018 04.56 Since the previous tracing, no significant change noted

 

DOCTOR:   Scottie Cardoza  Interpretating Date/Time  06/11/2018 14:54:21

## 2018-06-11 NOTE — EKG
Date Performed: 06/10/2018       Time Performed: 15:24:07

 

PTAGE:      48 years

 

EKG:      Sinus rhythm 

 

 SEPTAL MYOCARDIAL INFARCTION ABNORMAL ECG

 

PREVIOUS TRACING       : 06/10/2018 12.00 Consider anteoseptal myocardial infarction - age indetermin
ate

 

DOCTOR:   Scottie Cardoza  Interpretating Date/Time  06/11/2018 14:53:44

## 2018-06-12 VITALS
RESPIRATION RATE: 16 BRPM | TEMPERATURE: 97.4 F | DIASTOLIC BLOOD PRESSURE: 74 MMHG | HEART RATE: 53 BPM | SYSTOLIC BLOOD PRESSURE: 122 MMHG

## 2018-06-12 VITALS
DIASTOLIC BLOOD PRESSURE: 85 MMHG | SYSTOLIC BLOOD PRESSURE: 130 MMHG | HEART RATE: 74 BPM | OXYGEN SATURATION: 95 % | TEMPERATURE: 98.3 F | RESPIRATION RATE: 17 BRPM

## 2018-06-12 VITALS
TEMPERATURE: 98.5 F | HEART RATE: 67 BPM | SYSTOLIC BLOOD PRESSURE: 133 MMHG | OXYGEN SATURATION: 94 % | RESPIRATION RATE: 16 BRPM | DIASTOLIC BLOOD PRESSURE: 82 MMHG

## 2018-06-12 VITALS
RESPIRATION RATE: 16 BRPM | HEART RATE: 59 BPM | SYSTOLIC BLOOD PRESSURE: 120 MMHG | TEMPERATURE: 97.9 F | DIASTOLIC BLOOD PRESSURE: 75 MMHG

## 2018-06-12 LAB
ALBUMIN SERPL-MCNC: 3.2 GM/DL (ref 3.4–5)
ALP SERPL-CCNC: 156 U/L (ref 45–117)
ALT SERPL-CCNC: 32 U/L (ref 12–78)
AST SERPL-CCNC: 34 U/L (ref 15–37)
BILIRUB SERPL-MCNC: 0.4 MG/DL (ref 0.2–1)
BUN SERPL-MCNC: 18 MG/DL (ref 7–18)
CALCIUM SERPL-MCNC: 8.4 MG/DL (ref 8.5–10.1)
CHLORIDE SERPL-SCNC: 109 MEQ/L (ref 98–107)
CREAT SERPL-MCNC: 1.12 MG/DL (ref 0.6–1.3)
ERYTHROCYTE [DISTWIDTH] IN BLOOD BY AUTOMATED COUNT: 13.9 % (ref 11.6–17.2)
GFR SERPLBLD BASED ON 1.73 SQ M-ARVRAT: 69 ML/MIN (ref 89–?)
GLUCOSE SERPL-MCNC: 126 MG/DL (ref 74–106)
HCO3 BLD-SCNC: 23.3 MEQ/L (ref 21–32)
HCT VFR BLD CALC: 54 % (ref 39–51)
HGB BLD-MCNC: 18.5 GM/DL (ref 13–17)
MCH RBC QN AUTO: 30.2 PG (ref 27–34)
MCHC RBC AUTO-ENTMCNC: 34.3 % (ref 32–36)
MCV RBC AUTO: 88.3 FL (ref 80–100)
PLATELET # BLD: 121 TH/MM3 (ref 150–450)
PMV BLD AUTO: 10.3 FL (ref 7–11)
PROT SERPL-MCNC: 6.7 GM/DL (ref 6.4–8.2)
RBC # BLD AUTO: 6.12 MIL/MM3 (ref 4.5–5.9)
SODIUM SERPL-SCNC: 141 MEQ/L (ref 136–145)
WBC # BLD AUTO: 9.4 TH/MM3 (ref 4–11)

## 2018-06-12 RX ADMIN — DOLUTEGRAVIR SODIUM SCH MG: 50 TABLET, FILM COATED ORAL at 08:13

## 2018-06-12 RX ADMIN — MULTIPLE VITAMINS W/ MINERALS TAB SCH TAB: TAB at 08:13

## 2018-06-12 RX ADMIN — ESCITALOPRAM OXALATE SCH MG: 10 TABLET, FILM COATED ORAL at 08:14

## 2018-06-12 RX ADMIN — PANTOPRAZOLE SCH MG: 40 TABLET, DELAYED RELEASE ORAL at 08:13

## 2018-06-12 RX ADMIN — PRAZOSIN HYDROCHLORIDE SCH MG: 1 CAPSULE ORAL at 21:00

## 2018-06-12 RX ADMIN — Medication SCH MG: at 08:14

## 2018-06-12 RX ADMIN — BUDESONIDE AND FORMOTEROL FUMARATE DIHYDRATE SCH PUFF: 160; 4.5 AEROSOL RESPIRATORY (INHALATION) at 08:14

## 2018-06-12 RX ADMIN — BUDESONIDE AND FORMOTEROL FUMARATE DIHYDRATE SCH PUFF: 160; 4.5 AEROSOL RESPIRATORY (INHALATION) at 21:00

## 2018-06-12 RX ADMIN — ABACAVIR SULFATE SCH MG: 300 TABLET, FILM COATED ORAL at 08:14

## 2018-06-12 RX ADMIN — FOLIC ACID SCH MG: 1 TABLET ORAL at 08:13

## 2018-06-12 NOTE — HHI.PYPN
Subjective


Chief Complaint:  Depakote overdose


Remarks


Patient seen for follow, chart reviewed.  Discussion nursing staff reported the 

patient said been pleasant and cooperative staff, compliant with medications, 

valproic acid level has been decreasing.  Patient was found lying in hospital 

bed noted be more reactive today with interview.  Patient states they have been 

feeling a bit frustrated about how things have been going for him particularly 

not be able to resolve issues with his Social Security income as well as 

recently having had discord with girlfriend which led to a suicide attempt.  He 

states that his mood has been feeling anxious wants to go home but could also 

worried about being stable prior to his discharge.  He states that he wants to 

continue to live for his girlfriend and his daughter and to continue going to 

the beach when he enjoys.  He states that his mood has been better although 

denying suicide ideations today feeling less depressed.





Review of Systems


Except as stated in HPI:  all other systems reviewed are Neg





Mental Status Examination


Appearance:  Disheveled


Consciousness:  Alert


Orientation:  x4


Motor Activity:  Normal gait


Speech:  Unremarkable


Language:  Adequate


Fund of Knowledge:  Adequate


Attention and Concentration:  Adequate


Memory:  Unremarkable (Grossly intact on clinical exam)


Mood:  Sad


Affect:  Other (Restricted)


Thought Process & Associations:  Intact, Logical, Linear


Thought Content:  Appropriate


Hallucination Type:  None


Delusion Type:  None


Suicidal Ideation:  No (Unclear whether patient is reliable to contract for 

safety)


Suicidal Plan:  No


Suicidal Intention:  No


Homicidal Ideation:  No


Homicidal Plan:  No


Homicidal Intention:  No


Insight:  Fair


Judgment:  Impulsive





Results


Labs


Labs reviewed








Test


  6/11/18


21:25 6/12/18


08:00


 


Valproic Acid (Depakene) Level 50 MCG/ML  37 MCG/ML 


 


White Blood Count  9.4 TH/MM3 


 


Red Blood Count  6.12 MIL/MM3 


 


Hemoglobin  18.5 GM/DL 


 


Hematocrit  54.0 % 


 


Mean Corpuscular Volume  88.3 FL 


 


Mean Corpuscular Hemoglobin  30.2 PG 


 


Mean Corpuscular Hemoglobin


Concent 


  34.3 % 


 


 


Red Cell Distribution Width  13.9 % 


 


Platelet Count  121 TH/MM3 


 


Mean Platelet Volume  10.3 FL 


 


Blood Urea Nitrogen  18 MG/DL 


 


Creatinine  1.12 MG/DL 


 


Random Glucose  126 MG/DL 


 


Total Protein  6.7 GM/DL 


 


Albumin  3.2 GM/DL 


 


Calcium Level  8.4 MG/DL 


 


Alkaline Phosphatase  156 U/L 


 


Aspartate Amino Transf


(AST/SGOT) 


  34 U/L 


 


 


Alanine Aminotransferase


(ALT/SGPT) 


  32 U/L 


 


 


Total Bilirubin  0.4 MG/DL 


 


Sodium Level  141 MEQ/L 


 


Potassium Level  3.8 MEQ/L 


 


Chloride Level  109 MEQ/L 


 


Carbon Dioxide Level  23.3 MEQ/L 


 


Anion Gap  9 MEQ/L 


 


Estimat Glomerular Filtration


Rate 


  69 ML/MIN 


 


 


Ammonia  40 MCMOL/L 








Vitals/IOs





Vital Signs








  Date Time  Temp Pulse Resp B/P (MAP) Pulse Ox O2 Delivery O2 Flow Rate FiO2


 


6/12/18 10:30 98.5 67 16 133/82 (99) 94   


 


6/10/18 16:21      Room Air  


 


6/10/18 04:17       2.00 














Intake and Output   


 


 6/12/18 6/12/18 6/13/18





 08:00 16:00 00:00


 


Intake Total 480 ml 600 ml 


 


Balance 480 ml 600 ml 











Assessment & Plan


Problem List:  


(1) Adjustment disorder with mixed disturbance of emotions and conduct


ICD Codes:  F43.25 - Adjustment disorder with mixed disturbance of emotions and 

conduct


(2) Chronic post-traumatic stress disorder (PTSD)


ICD Codes:  F43.12 - Post-traumatic stress disorder, chronic


Assessment & Plan


Patient this time noted with improvement in mood, denying suicide ideations 

today and more reactive with affect today.  Patient valproic acid level 

decreasing, continue recommendations as per prior medical team.  Continue 

current treatment.  Continue to monitor mood and behavior.  Discharge planning 

a progress.


Justification for Cont. Inpt.


At risk of further decompensation a lower level of care.


Request HC Surrog/Guard Advoc?:  Brandon Martinez MD Jun 12, 2018 15:41

## 2018-06-12 NOTE — HHI.PR
Subjective


Remarks


Follow-up for Depakote overdose.  The patient is seen lying in bed watching TV.

  He is AAO 4.  He denies any medical complaints.  Denies any headache, 

lightheadedness, dizziness, weakness, chest pain, shortness of breath, or 

abdominal complaints.  He is tolerating oral intake.





Objective


Vitals





Vital Signs








  Date Time  Temp Pulse Resp B/P (MAP) Pulse Ox O2 Delivery O2 Flow Rate FiO2


 


6/12/18 10:30 98.5 67 16 133/82 (99) 94   


 


6/12/18 05:38 97.9 59 16 120/75 (90)    


 


6/12/18 01:00 97.4 53 16 122/74 (90)    


 


6/11/18 21:34 98.9 69 18 118/76 (90)    


 


6/11/18 16:50 98.6 74 16 112/69 (83) 97   














I/O      


 


 6/11/18 6/11/18 6/11/18 6/12/18 6/12/18 6/12/18





 06:59 14:59 22:59 06:59 14:59 22:59


 


Intake Total 960 ml 720 ml 1920 ml 1920 ml 600 ml 


 


Balance 960 ml 720 ml 1920 ml 1920 ml 600 ml 


 


      


 


Intake Oral 960 ml 720 ml 1920 ml 1920 ml 600 ml 


 


# Voids 1   2  








Result Diagram:  


6/12/18 0800                                                                   

             6/12/18 0800





Objective Remarks


GENERAL: Well-nourished, well-developed middle-aged male patient in NAD.


SKIN: Warm and dry. No rash.


HEENT:  Normocephalic. Atraumatic. Pupils equal and round.  Mucous membranes 

pink and moist.


CARDIOVASCULAR: Regular rate and rhythm.  No murmur appreciated. 


RESPIRATORY: No accessory muscle use. Clear to auscultation. Breath sounds 

equal bilaterally.  


GASTROINTESTINAL: Abdomen soft, non-tender, nondistended. Normoactive bowel 

sounds x4.


MUSCULOSKELETAL: No obvious deformities. Extremities without clubbing, cyanosis

, or edema. 


NEUROLOGICAL: Awake and alert. No obvious cranial nerve deficits.  Motor 

grossly within normal limits. Moving all extremities spontaneously. Normal 

speech.


Procedures


None


Medications and IVs





Current Medications








 Medications


  (Trade)  Dose


 Ordered  Sig/[place]


 Route  Start Time


 Stop Time Status Last Admin


 


  (Proair Hfa Inh)  1 puff  Q4H  PRN


 INH  6/10/18 15:15


     


 


 


  (Protonix)  40 mg  DAILY


 PO  6/11/18 09:00


    6/12/18 08:13


 


 


  (Symbicort


 160-4.5 Mcg Inh)  1 puff  BID


 INH  6/10/18 21:00


    6/12/18 08:14


 


 


  (Benadryl)  50 mg  HS  PRN


 PO  6/10/18 15:15


     


 


 


  (Tylenol)  650 mg  Q4H  PRN


 PO  6/10/18 15:15


     


 


 


  (Milk Of


 Magnesia Liq)  30 ml  DAILY  PRN


 PO  6/10/18 15:15


     


 


 


  (Mag-Al Plus


 Susp Liq)  30 ml  Q6H  PRN


 PO  6/10/18 15:15


     


 


 


  (Habitrol 21 Mg


 Patch.24 Hr)  1 patch  DAILY  PRN


 T-DERMAL  6/10/18 15:15


     


 


 


  (Atarax)  50 mg  Q6H  PRN


 PO  6/10/18 15:15


     


 


 


  (Cogentin)  1 mg  Q12H  PRN


 PO  6/10/18 15:15


     


 


 


  (Cogentin Inj)  1 mg  Q12H  PRN


 IM  6/10/18 15:15


     


 


 


  (Lexapro)  10 mg  DAILY


 PO  6/11/18 09:00


    6/12/18 08:14


 


 


  (Minipress)  1 mg  HS


 PO  6/10/18 21:00


     


 


 


  (Folate)  1 mg  DAILY


 PO  6/11/18 09:00


 6/16/18 08:59  6/12/18 08:13


 


 


  (Vitamin B1)  100 mg  DAILY


 PO  6/11/18 09:00


    6/12/18 08:14


 


 


  (Theragran M Tab)  1 tab  DAILY


 PO  6/11/18 09:00


 6/16/18 08:59  6/12/18 08:13


 


 


  (Romazicon Inj)  0.2 mg  Q1M  PRN


 IV PUSH  6/10/18 15:15


     


 


 


  (Ativan)  1 mg  Q4H  PRN


 PO  6/10/18 15:15


     


 


 


  (Ativan Inj)  1 mg  Q4H  PRN


 IM  6/10/18 15:15


     


 


 


  (Ativan)  2 mg  Q2H  PRN


 PO  6/10/18 15:15


     


 


 


  (Ativan Inj)  2 mg  Q2H  PRN


 IM  6/10/18 15:15


     


 


 


  (Ativan Inj)  2 mg  Q1H  PRN


 IM  6/10/18 15:15


     


 


 


  (Ativan Inj)  2 mg  Q15M  PRN


 IM  6/10/18 15:15


     


 


 


  (Ziagen)  600 mg  DAILY


 PO  6/10/18 18:15


    6/12/18 08:14


 


 


  (Epivir)  300 mg  DAILY


 PO  6/10/18 18:15


    6/12/18 08:14


 











A/P


Assessment and Plan


48-year-old male with HIV HAART med compliant, Hep C, ITP and COPD admitted to 

the med psych unit for Depakote overdose and hospitalist services consulted for 

assistance with medical management.





Attempted suicide, intentional Depakote OD


PTSD


   -Continue management per psychiatry





Acute metabolic encephalopathy secondary to depakote toxicity/overdose


   -EKG reviewed, longest corrected QT is 461 ms, last QTc 429


   -Valproic acid trended down from 112 to 37


   -monitor for QT prolongation with serial EKGs


   -neuro checks


   -Patient now AAOx4, resolved





Hyperammonemia, mild, secondary to above


Ammonia level 41


   -give dose 30ml lactulose x 1


   -repeat ammonia level 40, consistent with patient's baseline





MARCOS, secondary to dehydration


   -creatinine improved 1.36 to 1.05 s/p IVF


   -encourage oral hydration


   -avoid nephrotoxic agents





Transaminitis, mild, secondary to depakote toxicity


   -avoid hepatotoxic agents


   -LFTs trending down





Polysubstance abuse: Urine tox screen + benzos and cocaine


   -counseled on cessation   


   -continue CIWA


   -continue on daily thiamine/MVI/folate    


   -fall and seizure precautions





Thrombocytopenia: No active bleeding noted


   -monitor for signs/sx bleeding.  


   -stable


   -continue outpatient f/up with hematologist   





HIV, Hep C: allegedly last CD4 count is more than 700


   -standard precautions


   -Continue HAART medications


   -Continue outpatient f/up





COPD, not in acute exacerbation


   -monitor respiratory status


   -continue on bronchodilators


   -Pro Air as needed





DVT prophylaxis: Low risk, ambulatory. Avoid chemoprophylaxis with 

thrombocytopenia.


Discharge Planning


The patient is medically stable for discharge. Continue outpatient f/up with 

infectious disease and hematology.











Marilyn Macias PA-C Jun 12, 2018 2:30 pm

## 2018-06-12 NOTE — EKG
Date Performed: 06/12/2018       Time Performed: 05:33:11

 

PTAGE:      48 years

 

EKG:      SINUS BRADYCARDIA SEPTAL MYOCARDIAL INFARCTION , OF INDETERMINATE AGE ABNORMAL ECG Compared
 to prior electrocardiogram, Septal infarct pattern is present although this could be related to lead
 placement. Clinical correlation suggested. 

 

 PREVIOUS TRACING            : 06/11/2018 08.11

 

DOCTOR:   Scooby Chow  Interpretating Date/Time  06/12/2018 14:11:45

## 2018-06-13 VITALS
HEART RATE: 60 BPM | RESPIRATION RATE: 17 BRPM | SYSTOLIC BLOOD PRESSURE: 106 MMHG | OXYGEN SATURATION: 96 % | TEMPERATURE: 97.6 F | DIASTOLIC BLOOD PRESSURE: 60 MMHG

## 2018-06-13 VITALS
RESPIRATION RATE: 17 BRPM | OXYGEN SATURATION: 95 % | DIASTOLIC BLOOD PRESSURE: 46 MMHG | TEMPERATURE: 98 F | HEART RATE: 61 BPM | SYSTOLIC BLOOD PRESSURE: 67 MMHG

## 2018-06-13 RX ADMIN — ESCITALOPRAM OXALATE SCH MG: 10 TABLET, FILM COATED ORAL at 08:43

## 2018-06-13 RX ADMIN — PANTOPRAZOLE SCH MG: 40 TABLET, DELAYED RELEASE ORAL at 08:43

## 2018-06-13 RX ADMIN — Medication SCH MG: at 08:42

## 2018-06-13 RX ADMIN — FOLIC ACID SCH MG: 1 TABLET ORAL at 08:43

## 2018-06-13 RX ADMIN — BUDESONIDE AND FORMOTEROL FUMARATE DIHYDRATE SCH PUFF: 160; 4.5 AEROSOL RESPIRATORY (INHALATION) at 08:43

## 2018-06-13 RX ADMIN — DOLUTEGRAVIR SODIUM SCH MG: 50 TABLET, FILM COATED ORAL at 08:42

## 2018-06-13 RX ADMIN — ABACAVIR SULFATE SCH MG: 300 TABLET, FILM COATED ORAL at 08:43

## 2018-06-13 RX ADMIN — MULTIPLE VITAMINS W/ MINERALS TAB SCH TAB: TAB at 08:42
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
## 2018-06-13 NOTE — HHI.PR
Subjective


Remarks


Follow up for depakote overdose. The patient is seen ambulating the hallways. 

He has no medical complaints. Denies any headache, lightheadedness, dizziness, 

chest pain, shortness of breath, or abdominal complaints. He is AAOx4. Vital 

signs reviewed and stable.





Objective


Vitals





Vital Signs








  Date Time  Temp Pulse Resp B/P (MAP) Pulse Ox O2 Delivery O2 Flow Rate FiO2


 


6/13/18 05:15 97.6 60 17 106/60 (75) 96   


 


6/12/18 18:47 98.3 74 17 130/85 (100) 95   














I/O      


 


 6/12/18 6/12/18 6/12/18 6/13/18 6/13/18 6/13/18





 07:00 15:00 23:00 07:00 15:00 23:00


 


Intake Total 1920 ml 600 ml 1200 ml 1440 ml  


 


Balance 1920 ml 600 ml 1200 ml 1440 ml  


 


      


 


Intake Oral 1920 ml 600 ml 1200 ml 1440 ml  


 


# Voids 2   2  








Result Diagram:  


6/12/18 0800                                                                   

             6/12/18 0800





Objective Remarks


GENERAL: Well-nourished, well-developed middle-aged male patient in NAD.


SKIN: Warm and dry. No rash.


HEENT:  Normocephalic. Atraumatic. Pupils equal and round.  Mucous membranes 

pink and moist.


CARDIOVASCULAR: Regular rate and rhythm.  No murmur appreciated. 


RESPIRATORY: No accessory muscle use. Clear to auscultation. Breath sounds 

equal bilaterally.  


GASTROINTESTINAL: Abdomen soft, non-tender, nondistended. Normoactive bowel 

sounds x4.


MUSCULOSKELETAL: No obvious deformities. Extremities without clubbing, cyanosis

, or edema. 


NEUROLOGICAL: Awake and alert. No obvious cranial nerve deficits.  Motor 

grossly within normal limits. Moving all extremities spontaneously. Normal 

speech.


Procedures


None


Medications and IVs





Current Medications








 Medications


  (Trade)  Dose


 Ordered  Sig/[place]


 Route  Start Time


 Stop Time Status Last Admin


 


  (Proair Hfa Inh)  1 puff  Q4H  PRN


 INH  6/10/18 15:15


     


 


 


  (Protonix)  40 mg  DAILY


 PO  6/11/18 09:00


    6/13/18 08:43


 


 


  (Symbicort


 160-4.5 Mcg Inh)  1 puff  BID


 INH  6/10/18 21:00


    6/13/18 08:43


 


 


  (Benadryl)  50 mg  HS  PRN


 PO  6/10/18 15:15


     


 


 


  (Tylenol)  650 mg  Q4H  PRN


 PO  6/10/18 15:15


     


 


 


  (Milk Of


 Magnesia Liq)  30 ml  DAILY  PRN


 PO  6/10/18 15:15


     


 


 


  (Mag-Al Plus


 Susp Liq)  30 ml  Q6H  PRN


 PO  6/10/18 15:15


     


 


 


  (Habitrol 21 Mg


 Patch.24 Hr)  1 patch  DAILY  PRN


 T-DERMAL  6/10/18 15:15


     


 


 


  (Atarax)  50 mg  Q6H  PRN


 PO  6/10/18 15:15


     


 


 


  (Cogentin)  1 mg  Q12H  PRN


 PO  6/10/18 15:15


     


 


 


  (Cogentin Inj)  1 mg  Q12H  PRN


 IM  6/10/18 15:15


     


 


 


  (Lexapro)  10 mg  DAILY


 PO  6/11/18 09:00


    6/13/18 08:43


 


 


  (Minipress)  1 mg  HS


 PO  6/10/18 21:00


     


 


 


  (Folate)  1 mg  DAILY


 PO  6/11/18 09:00


 6/16/18 08:59  6/13/18 08:43


 


 


  (Vitamin B1)  100 mg  DAILY


 PO  6/11/18 09:00


    6/13/18 08:42


 


 


  (Theragran M Tab)  1 tab  DAILY


 PO  6/11/18 09:00


 6/16/18 08:59  6/13/18 08:42


 


 


  (Romazicon Inj)  0.2 mg  Q1M  PRN


 IV PUSH  6/10/18 15:15


     


 


 


  (Ativan)  1 mg  Q4H  PRN


 PO  6/10/18 15:15


     


 


 


  (Ativan Inj)  1 mg  Q4H  PRN


 IM  6/10/18 15:15


     


 


 


  (Ativan)  2 mg  Q2H  PRN


 PO  6/10/18 15:15


     


 


 


  (Ativan Inj)  2 mg  Q2H  PRN


 IM  6/10/18 15:15


     


 


 


  (Ativan Inj)  2 mg  Q1H  PRN


 IM  6/10/18 15:15


     


 


 


  (Ativan Inj)  2 mg  Q15M  PRN


 IM  6/10/18 15:15


     


 


 


  (Ziagen)  600 mg  DAILY


 PO  6/10/18 18:15


    6/13/18 08:43


 


 


  (Epivir)  300 mg  DAILY


 PO  6/10/18 18:15


    6/13/18 08:43


 











A/P


Assessment and Plan


48-year-old male with HIV HAART med compliant, Hep C, ITP and COPD admitted to 

the med psych unit for Depakote overdose and hospitalist services consulted for 

assistance with medical management.





Attempted suicide, intentional Depakote OD, PTSD


   -Continue management per psychiatry





Acute metabolic encephalopathy secondary to depakote toxicity/overdose


   -EKG reviewed, longest corrected QT is 461 ms, last QTc 429


   -Valproic acid trended down from 112 to 37


   -monitor for QT prolongation with serial EKGs


   -neuro checks


   -Patient now AAOx4, resolved





Hyperammonemia: mild, secondary to chronic liver disease, hepatitis C, with 

acute depakote overdose. Ammonia level 41


   -given 30ml lactulose x 1


   -repeat ammonia level 40, consistent with patient's baseline


   -patient AAOx4


   -continue daily lactulose





MARCOS, secondary to dehydration


   -creatinine improved 1.36 to 1.05 s/p IVF


   -encourage oral hydration


   -avoid nephrotoxic agents





Transaminitis, mild, secondary chronic Hepatitis C in combination with acute 

depakote toxicity


   -avoid hepatotoxic agents


   -LFTs trended down, consistent with patient's baseline, stable





Polysubstance abuse: Urine tox screen + benzos and cocaine


   -counseled on cessation   


   -continue CIWA


   -continue on daily thiamine/MVI/folate    


   -fall and seizure precautions





Thrombocytopenia: No active bleeding noted


   -monitor for signs/sx bleeding.  


   -stable


   -continue outpatient f/up with hematologist   





HIV, Hep C: allegedly last CD4 count is more than 700


   -standard precautions


   -Continue HAART medications


   -Continue outpatient f/up





COPD, not in acute exacerbation


   -monitor respiratory status


   -continue on bronchodilators


   -Pro Air as needed





DVT prophylaxis: Low risk, ambulatory. Avoid chemoprophylaxis with 

thrombocytopenia.


Discharge Planning


The patient is medically stable for discharge. Continue outpatient f/up with 

infectious disease and hematology.











Marilyn Macias PA-C Jun 13, 2018 1:27 pm